# Patient Record
Sex: FEMALE | Race: WHITE | NOT HISPANIC OR LATINO | URBAN - METROPOLITAN AREA
[De-identification: names, ages, dates, MRNs, and addresses within clinical notes are randomized per-mention and may not be internally consistent; named-entity substitution may affect disease eponyms.]

---

## 2020-02-20 ENCOUNTER — EMERGENCY (EMERGENCY)
Facility: HOSPITAL | Age: 65
LOS: 1 days | Discharge: HOME | End: 2020-02-22
Attending: EMERGENCY MEDICINE
Payer: MEDICARE

## 2020-02-20 VITALS — WEIGHT: 139.99 LBS | HEIGHT: 60 IN

## 2020-02-20 DIAGNOSIS — I16.0 HYPERTENSIVE URGENCY: ICD-10-CM

## 2020-02-20 DIAGNOSIS — F32.9 MAJOR DEPRESSIVE DISORDER, SINGLE EPISODE, UNSPECIFIED: ICD-10-CM

## 2020-02-20 DIAGNOSIS — C79.51 SECONDARY MALIGNANT NEOPLASM OF BONE: ICD-10-CM

## 2020-02-20 DIAGNOSIS — R51 HEADACHE: ICD-10-CM

## 2020-02-20 DIAGNOSIS — R03.0 ELEVATED BLOOD-PRESSURE READING, WITHOUT DIAGNOSIS OF HYPERTENSION: ICD-10-CM

## 2020-02-20 DIAGNOSIS — B96.89 OTHER SPECIFIED BACTERIAL AGENTS AS THE CAUSE OF DISEASES CLASSIFIED ELSEWHERE: ICD-10-CM

## 2020-02-20 DIAGNOSIS — F41.9 ANXIETY DISORDER, UNSPECIFIED: ICD-10-CM

## 2020-02-20 DIAGNOSIS — C50.919 MALIGNANT NEOPLASM OF UNSPECIFIED SITE OF UNSPECIFIED FEMALE BREAST: ICD-10-CM

## 2020-02-20 DIAGNOSIS — R79.89 OTHER SPECIFIED ABNORMAL FINDINGS OF BLOOD CHEMISTRY: ICD-10-CM

## 2020-02-20 DIAGNOSIS — Z88.1 ALLERGY STATUS TO OTHER ANTIBIOTIC AGENTS STATUS: ICD-10-CM

## 2020-02-20 DIAGNOSIS — Z92.21 PERSONAL HISTORY OF ANTINEOPLASTIC CHEMOTHERAPY: ICD-10-CM

## 2020-02-20 DIAGNOSIS — N39.0 URINARY TRACT INFECTION, SITE NOT SPECIFIED: ICD-10-CM

## 2020-02-20 LAB
ALBUMIN SERPL ELPH-MCNC: 3.6 G/DL — SIGNIFICANT CHANGE UP (ref 3.3–5)
ALP SERPL-CCNC: 70 U/L — SIGNIFICANT CHANGE UP (ref 40–120)
ALT FLD-CCNC: 22 U/L — SIGNIFICANT CHANGE UP (ref 12–78)
ANION GAP SERPL CALC-SCNC: 6 MMOL/L — SIGNIFICANT CHANGE UP (ref 5–17)
APPEARANCE UR: CLEAR — SIGNIFICANT CHANGE UP
AST SERPL-CCNC: 28 U/L — SIGNIFICANT CHANGE UP (ref 15–37)
BASOPHILS # BLD AUTO: 0.04 K/UL — SIGNIFICANT CHANGE UP (ref 0–0.2)
BASOPHILS NFR BLD AUTO: 1.6 % — SIGNIFICANT CHANGE UP (ref 0–2)
BILIRUB SERPL-MCNC: 0.2 MG/DL — SIGNIFICANT CHANGE UP (ref 0.2–1.2)
BILIRUB UR-MCNC: NEGATIVE — SIGNIFICANT CHANGE UP
BUN SERPL-MCNC: 21 MG/DL — SIGNIFICANT CHANGE UP (ref 7–23)
CALCIUM SERPL-MCNC: 9.2 MG/DL — SIGNIFICANT CHANGE UP (ref 8.5–10.1)
CHLORIDE SERPL-SCNC: 107 MMOL/L — SIGNIFICANT CHANGE UP (ref 96–108)
CK SERPL-CCNC: 87 U/L — SIGNIFICANT CHANGE UP (ref 26–192)
CO2 SERPL-SCNC: 28 MMOL/L — SIGNIFICANT CHANGE UP (ref 22–31)
COLOR SPEC: YELLOW — SIGNIFICANT CHANGE UP
CREAT SERPL-MCNC: 1.21 MG/DL — SIGNIFICANT CHANGE UP (ref 0.5–1.3)
DIFF PNL FLD: ABNORMAL
EOSINOPHIL # BLD AUTO: 0.03 K/UL — SIGNIFICANT CHANGE UP (ref 0–0.5)
EOSINOPHIL NFR BLD AUTO: 1.2 % — SIGNIFICANT CHANGE UP (ref 0–6)
GLUCOSE SERPL-MCNC: 100 MG/DL — HIGH (ref 70–99)
GLUCOSE UR QL: NEGATIVE MG/DL — SIGNIFICANT CHANGE UP
HCT VFR BLD CALC: 36.1 % — SIGNIFICANT CHANGE UP (ref 34.5–45)
HGB BLD-MCNC: 11.5 G/DL — SIGNIFICANT CHANGE UP (ref 11.5–15.5)
IMM GRANULOCYTES NFR BLD AUTO: 0.4 % — SIGNIFICANT CHANGE UP (ref 0–1.5)
KETONES UR-MCNC: NEGATIVE — SIGNIFICANT CHANGE UP
LACTATE SERPL-SCNC: 1.1 MMOL/L — SIGNIFICANT CHANGE UP (ref 0.7–2)
LEUKOCYTE ESTERASE UR-ACNC: ABNORMAL
LYMPHOCYTES # BLD AUTO: 0.73 K/UL — LOW (ref 1–3.3)
LYMPHOCYTES # BLD AUTO: 28.9 % — SIGNIFICANT CHANGE UP (ref 13–44)
MCHC RBC-ENTMCNC: 31.6 PG — SIGNIFICANT CHANGE UP (ref 27–34)
MCHC RBC-ENTMCNC: 31.9 GM/DL — LOW (ref 32–36)
MCV RBC AUTO: 99.2 FL — SIGNIFICANT CHANGE UP (ref 80–100)
MONOCYTES # BLD AUTO: 0.41 K/UL — SIGNIFICANT CHANGE UP (ref 0–0.9)
MONOCYTES NFR BLD AUTO: 16.2 % — HIGH (ref 2–14)
NEUTROPHILS # BLD AUTO: 1.31 K/UL — LOW (ref 1.8–7.4)
NEUTROPHILS NFR BLD AUTO: 51.7 % — SIGNIFICANT CHANGE UP (ref 43–77)
NITRITE UR-MCNC: NEGATIVE — SIGNIFICANT CHANGE UP
PH UR: 6 — SIGNIFICANT CHANGE UP (ref 5–8)
PLATELET # BLD AUTO: 234 K/UL — SIGNIFICANT CHANGE UP (ref 150–400)
POTASSIUM SERPL-MCNC: 4.3 MMOL/L — SIGNIFICANT CHANGE UP (ref 3.5–5.3)
POTASSIUM SERPL-SCNC: 4.3 MMOL/L — SIGNIFICANT CHANGE UP (ref 3.5–5.3)
PROT SERPL-MCNC: 8 GM/DL — SIGNIFICANT CHANGE UP (ref 6–8.3)
PROT UR-MCNC: 15 MG/DL
RBC # BLD: 3.64 M/UL — LOW (ref 3.8–5.2)
RBC # FLD: 13.9 % — SIGNIFICANT CHANGE UP (ref 10.3–14.5)
SODIUM SERPL-SCNC: 141 MMOL/L — SIGNIFICANT CHANGE UP (ref 135–145)
SP GR SPEC: 1.01 — SIGNIFICANT CHANGE UP (ref 1.01–1.02)
TROPONIN I SERPL-MCNC: 0.08 NG/ML — HIGH (ref 0.01–0.04)
UROBILINOGEN FLD QL: NEGATIVE MG/DL — SIGNIFICANT CHANGE UP
WBC # BLD: 2.53 K/UL — LOW (ref 3.8–10.5)
WBC # FLD AUTO: 2.53 K/UL — LOW (ref 3.8–10.5)

## 2020-02-20 PROCEDURE — 87040 BLOOD CULTURE FOR BACTERIA: CPT

## 2020-02-20 PROCEDURE — 85025 COMPLETE CBC W/AUTO DIFF WBC: CPT

## 2020-02-20 PROCEDURE — 93005 ELECTROCARDIOGRAM TRACING: CPT

## 2020-02-20 PROCEDURE — 80053 COMPREHEN METABOLIC PANEL: CPT

## 2020-02-20 PROCEDURE — 87086 URINE CULTURE/COLONY COUNT: CPT

## 2020-02-20 PROCEDURE — 86803 HEPATITIS C AB TEST: CPT

## 2020-02-20 PROCEDURE — 70450 CT HEAD/BRAIN W/O DYE: CPT

## 2020-02-20 PROCEDURE — 99285 EMERGENCY DEPT VISIT HI MDM: CPT | Mod: 25

## 2020-02-20 PROCEDURE — 99284 EMERGENCY DEPT VISIT MOD MDM: CPT

## 2020-02-20 PROCEDURE — 71045 X-RAY EXAM CHEST 1 VIEW: CPT | Mod: 26

## 2020-02-20 PROCEDURE — 70450 CT HEAD/BRAIN W/O DYE: CPT | Mod: 26

## 2020-02-20 PROCEDURE — 85027 COMPLETE CBC AUTOMATED: CPT

## 2020-02-20 PROCEDURE — 93010 ELECTROCARDIOGRAM REPORT: CPT

## 2020-02-20 PROCEDURE — 83605 ASSAY OF LACTIC ACID: CPT

## 2020-02-20 PROCEDURE — 81001 URINALYSIS AUTO W/SCOPE: CPT

## 2020-02-20 PROCEDURE — 71045 X-RAY EXAM CHEST 1 VIEW: CPT

## 2020-02-20 PROCEDURE — G0378: CPT

## 2020-02-20 PROCEDURE — 82550 ASSAY OF CK (CPK): CPT

## 2020-02-20 PROCEDURE — 96374 THER/PROPH/DIAG INJ IV PUSH: CPT

## 2020-02-20 PROCEDURE — 36415 COLL VENOUS BLD VENIPUNCTURE: CPT

## 2020-02-20 PROCEDURE — 80061 LIPID PANEL: CPT

## 2020-02-20 PROCEDURE — 84484 ASSAY OF TROPONIN QUANT: CPT

## 2020-02-20 RX ORDER — CEFEPIME 1 G/1
1000 INJECTION, POWDER, FOR SOLUTION INTRAMUSCULAR; INTRAVENOUS ONCE
Refills: 0 | Status: COMPLETED | OUTPATIENT
Start: 2020-02-20 | End: 2020-02-20

## 2020-02-20 RX ADMIN — CEFEPIME 1000 MILLIGRAM(S): 1 INJECTION, POWDER, FOR SOLUTION INTRAMUSCULAR; INTRAVENOUS at 23:38

## 2020-02-20 NOTE — ED ADULT TRIAGE NOTE - CHIEF COMPLAINT QUOTE
pt c/o headache, dizziness, reports BP at home 211/75 30 min ago.  Took 10 mg of nifedipine PTA.  - vision changes, hx of stage 4 CA.  LAMS score 0 in triage

## 2020-02-20 NOTE — ED STATDOCS - PROGRESS NOTE DETAILS
Esthela FAJARDO for ED attending, Dr. Marcelino: 65 y/o F with PMHx of stage 4 breast CA presenting to the ED c/o high blood pressure. Patient reports she started to get dizziness, and HA and she took her BP and found it to be 211/ 75.  Patient took 10 mg Nifedipine PTA and the dizziness and HA subsided on ED arrival. No past hx of having this HA or dizziness. Notes that she had recent lab results x3 days ago and found to have low blood cell counts.  Denies any CP, dysuria, cough, nasal congestion, blurry vision, double vision, fever or chills. Patient will be sent to [facility] for further evaluation.

## 2020-02-20 NOTE — ED PROVIDER NOTE - NS_ ATTENDINGSCRIBEDETAILS _ED_A_ED_FT
I, Richard Galicia MD,  performed the initial face to face bedside interview with this patient regarding history of present illness, review of symptoms and relevant past medical, social and family history.  I completed an independent physical examination.    The history, relevant review of systems, past medical and surgical history, medical decision making, and physical examination was documented by the scribe in my presence and I attest to the accuracy of the documentation.

## 2020-02-20 NOTE — ED ADULT NURSE NOTE - OBJECTIVE STATEMENT
Pt presented to ED c/o subjective hypertension at home tonight. Pt states she took her pressure at home and it was over 200 systolic. Took Nifedipine PO "because you're only supposed to take it if your pressure is high" PTA. Denies headache, dizziness, nausea, blurred vision. Ambulatory from triage. Multiple abrasions and skin tears due to oral chemo agents. No active bleeding. Neutropenic precautions. Hx of breast cancer, on oral chemo. Safety/fall precautions.

## 2020-02-20 NOTE — ED ADULT NURSE NOTE - NSIMPLEMENTINTERV_GEN_ALL_ED
Implemented All Fall Risk Interventions:  [place] to call system. Call bell, personal items and telephone within reach. Instruct patient to call for assistance. Room bathroom lighting operational. Non-slip footwear when patient is off stretcher. Physically safe environment: no spills, clutter or unnecessary equipment. Stretcher in lowest position, wheels locked, appropriate side rails in place. Provide visual cue, wrist band, yellow gown, etc. Monitor gait and stability. Monitor for mental status changes and reorient to person, place, and time. Review medications for side effects contributing to fall risk. Reinforce activity limits and safety measures with patient and family.

## 2020-02-20 NOTE — ED PROVIDER NOTE - OBJECTIVE STATEMENT
65 y/o female with PMHx of stage 4 breast CA presents to the ED c/o sudden onset of HA tonight. Took BP found it to be 211/78. Took 10 mg Nifedipine PTA and HA has since resolved. Reports recent outpatient blood work showed low WBC 2/2 chemo. No fever. Allergic to erythromycin. PCP: Dr. Joel Goldberg.

## 2020-02-21 DIAGNOSIS — R82.90 UNSPECIFIED ABNORMAL FINDINGS IN URINE: ICD-10-CM

## 2020-02-21 DIAGNOSIS — Z29.9 ENCOUNTER FOR PROPHYLACTIC MEASURES, UNSPECIFIED: ICD-10-CM

## 2020-02-21 DIAGNOSIS — R79.89 OTHER SPECIFIED ABNORMAL FINDINGS OF BLOOD CHEMISTRY: ICD-10-CM

## 2020-02-21 DIAGNOSIS — I16.0 HYPERTENSIVE URGENCY: ICD-10-CM

## 2020-02-21 DIAGNOSIS — C50.919 MALIGNANT NEOPLASM OF UNSPECIFIED SITE OF UNSPECIFIED FEMALE BREAST: ICD-10-CM

## 2020-02-21 LAB
TROPONIN I SERPL-MCNC: 0.52 NG/ML — HIGH (ref 0.01–0.04)
TROPONIN I SERPL-MCNC: 0.87 NG/ML — HIGH (ref 0.01–0.04)

## 2020-02-21 PROCEDURE — 99220: CPT

## 2020-02-21 PROCEDURE — 93010 ELECTROCARDIOGRAM REPORT: CPT

## 2020-02-21 RX ORDER — TRANYLCYPROMINE SULFATE 10 MG/1
30 TABLET, FILM COATED ORAL
Refills: 0 | Status: DISCONTINUED | OUTPATIENT
Start: 2020-02-21 | End: 2020-02-22

## 2020-02-21 RX ORDER — CEPHALEXIN 500 MG
500 CAPSULE ORAL EVERY 12 HOURS
Refills: 0 | Status: DISCONTINUED | OUTPATIENT
Start: 2020-02-21 | End: 2020-02-22

## 2020-02-21 RX ORDER — CALCIUM CARBONATE 500(1250)
2 TABLET ORAL DAILY
Refills: 0 | Status: DISCONTINUED | OUTPATIENT
Start: 2020-02-21 | End: 2020-02-22

## 2020-02-21 RX ORDER — ATORVASTATIN CALCIUM 80 MG/1
10 TABLET, FILM COATED ORAL AT BEDTIME
Refills: 0 | Status: DISCONTINUED | OUTPATIENT
Start: 2020-02-21 | End: 2020-02-22

## 2020-02-21 RX ORDER — ONDANSETRON 8 MG/1
4 TABLET, FILM COATED ORAL EVERY 6 HOURS
Refills: 0 | Status: DISCONTINUED | OUTPATIENT
Start: 2020-02-21 | End: 2020-02-22

## 2020-02-21 RX ORDER — ENOXAPARIN SODIUM 100 MG/ML
40 INJECTION SUBCUTANEOUS DAILY
Refills: 0 | Status: DISCONTINUED | OUTPATIENT
Start: 2020-02-21 | End: 2020-02-22

## 2020-02-21 RX ORDER — ACETAMINOPHEN 500 MG
650 TABLET ORAL EVERY 6 HOURS
Refills: 0 | Status: DISCONTINUED | OUTPATIENT
Start: 2020-02-21 | End: 2020-02-22

## 2020-02-21 RX ORDER — HYDRALAZINE HCL 50 MG
10 TABLET ORAL EVERY 6 HOURS
Refills: 0 | Status: DISCONTINUED | OUTPATIENT
Start: 2020-02-21 | End: 2020-02-22

## 2020-02-21 RX ORDER — LETROZOLE 2.5 MG/1
2.5 TABLET, FILM COATED ORAL DAILY
Refills: 0 | Status: DISCONTINUED | OUTPATIENT
Start: 2020-02-21 | End: 2020-02-22

## 2020-02-21 RX ORDER — CHOLECALCIFEROL (VITAMIN D3) 125 MCG
1000 CAPSULE ORAL DAILY
Refills: 0 | Status: DISCONTINUED | OUTPATIENT
Start: 2020-02-21 | End: 2020-02-22

## 2020-02-21 RX ORDER — ASPIRIN/CALCIUM CARB/MAGNESIUM 324 MG
81 TABLET ORAL DAILY
Refills: 0 | Status: DISCONTINUED | OUTPATIENT
Start: 2020-02-21 | End: 2020-02-22

## 2020-02-21 RX ADMIN — ENOXAPARIN SODIUM 40 MILLIGRAM(S): 100 INJECTION SUBCUTANEOUS at 11:24

## 2020-02-21 RX ADMIN — Medication 2 TABLET(S): at 13:42

## 2020-02-21 RX ADMIN — LETROZOLE 2.5 MILLIGRAM(S): 2.5 TABLET, FILM COATED ORAL at 13:42

## 2020-02-21 RX ADMIN — Medication 1000 UNIT(S): at 11:23

## 2020-02-21 RX ADMIN — Medication 81 MILLIGRAM(S): at 11:23

## 2020-02-21 RX ADMIN — Medication 500 MILLIGRAM(S): at 17:32

## 2020-02-21 NOTE — H&P ADULT - PROBLEM SELECTOR PLAN 2
indeterminate  denies any cp, sob  likely demand ischemia 2/2 stress, anxiety   monitor on tele  check serial trops  cardio cs- dw Dr Thomas   check TTE, lipid profile  start asa 81mg

## 2020-02-21 NOTE — H&P ADULT - PROBLEM SELECTOR PLAN 1
bp improved now  start prn hydralazine  monitor bp and if pt consistently requires PRN, consider d/c on OTC  Headache improved w/ bp improvement

## 2020-02-21 NOTE — CONSULT NOTE ADULT - SUBJECTIVE AND OBJECTIVE BOX
Patient is a 64y old  Female who presents with a chief complaint of elevated bp, HA.       HPI:  HPI:  64F w/PMH Breast CA st4 w/ mets to spine, depression, anxiety, presents c/o elevated bp.  Pt started having global HA, 10/10, squeezing in quality and decided to check her BP and found it to be  211/78.  She had her  repeat it several times w/ similar outcome.  She decided to take a nortriptyline that she had at home, after which her bp and HA improved.  She came to ED as she's never experienced this before and she does not have a h/o htn.  She denies cp, sob, blurry vision, n/v/d, abd pain, dysuria, f/c.  Pt endorses anxiety and increased stress recently.      In ED, WBC 2.5, trop 0.08-->0.8, cxr neg, CT head neg, EKG NSR, UA +LE/bact- given IV cefepime.      Pt denies any CP or SOB. Pt states that she did not eat any thing out of ordinary.    PAST MEDICAL & SURGICAL HISTORY:  Breast CA - stage 4 w/ mets to spine   depression  anxiety  s/p spinal surgery      Allergies  erythromycin (Stomach Upset; Vomiting)      Social History:   lives w/   ind ADLs  denies smoking/etoh/drugs    FAMILY HISTORY:  unable to determine at this time d/t pt's anxiety     Home Medications:  Allegra: 90 microgram(s) orally once a day, As Needed (2020 09:59)  calcium carbonate 1000 mg oral tablet, chewable: orally once a day (2020 09:59)  flunisolide 25 mcg/inh nasal spray: 2 puff(s) nasal 2 times a day (:59)  Ibrance 75 mg oral capsule: 1 cap(s) orally once a day (2020 09:59)  letrozole 2.5 mg oral tablet: 1 tab(s) orally once a day (:59)  Livalo 2 mg oral tablet: 1 tab(s) orally once a day (at bedtime) (2020 09:59)  Parnate: 40 milligram(s) orally once a day (2020 09:59)  Vitamin D3 1000 intl units (25 mcg) oral tablet: 1 tab(s) orally once a day (2020 09:59)      MEDICATIONS  (STANDING):  atorvastatin 10 milliGRAM(s) Oral at bedtime  calcium carbonate    500 mG (Tums) Chewable 2 Tablet(s) Chew daily  cephalexin 500 milliGRAM(s) Oral every 12 hours  cholecalciferol 1000 Unit(s) Oral daily  enoxaparin Injectable 40 milliGRAM(s) SubCutaneous daily  letrozole 2.5 milliGRAM(s) Oral daily    MEDICATIONS  (PRN):  acetaminophen   Tablet .. 650 milliGRAM(s) Oral every 6 hours PRN Temp greater or equal to 38C (100.4F), Mild Pain (1 - 3)  hydrALAZINE Injectable 10 milliGRAM(s) IV Push every 6 hours PRN sbp>150  ondansetron Injectable 4 milliGRAM(s) IV Push every 6 hours PRN Nausea        PHYSICAL EXAM:  Vital Signs Last 24 Hrs  T(C): 36.6 (2020 10:14), Max: 36.8 (2020 23:39)  T(F): 97.9 (2020 10:14), Max: 98.2 (2020 23:39)  HR: 80 (2020 10:14) (68 - 96)  BP: 99/54 (2020 10:14) (99/54 - 147/66)  BP(mean): 77 (2020 01:04) (77 - 90)  RR: 18 (2020 10:14) (16 - 18)  SpO2: 95% (2020 10:14) (95% - 100%)          PAST MEDICAL & SURGICAL HISTORY:  Breast CA  No significant past surgical history      MEDICATIONS  (STANDING):  aspirin  chewable 81 milliGRAM(s) Oral daily  atorvastatin 10 milliGRAM(s) Oral at bedtime  calcium carbonate    500 mG (Tums) Chewable 2 Tablet(s) Chew daily  cephalexin 500 milliGRAM(s) Oral every 12 hours  cholecalciferol 1000 Unit(s) Oral daily  enoxaparin Injectable 40 milliGRAM(s) SubCutaneous daily  letrozole 2.5 milliGRAM(s) Oral daily  tranylcypromine 30 milliGRAM(s) Oral two times a day    MEDICATIONS  (PRN):  acetaminophen   Tablet .. 650 milliGRAM(s) Oral every 6 hours PRN Temp greater or equal to 38C (100.4F), Mild Pain (1 - 3)  hydrALAZINE Injectable 10 milliGRAM(s) IV Push every 6 hours PRN sbp>150  ondansetron Injectable 4 milliGRAM(s) IV Push every 6 hours PRN Nausea          REVIEW OF SYSTEMS:  CONSTITUTIONAL:    No fatigue, malaise, lethargy.  No fever or chills.  RESPIRATORY:  No cough.  No wheeze.  No hemoptysis.  No shortness of breath.  CARDIOVASCULAR:  No chest pains.  No palpitations. No shortness of breath, No orthopnea or PND.  GASTROINTESTINAL:  No abdominal pain.  No nausea or vomiting.    GENITOURINARY:    No hematuria.    MUSCULOSKELETAL:  No musculoskeletal pain.  No joint swelling.  No arthritis.  NEUROLOGICAL:  No tingling or numbness or weakness.  PSYCHIATRIC:  No confusion  SKIN:  No rashes.             Vital Signs Last 24 Hrs  T(C): 36.6 (2020 10:14), Max: 36.8 (2020 23:39)  T(F): 97.9 (2020 10:14), Max: 98.2 (2020 23:39)  HR: 80 (2020 10:14) (68 - 96)  BP: 99/54 (2020 10:14) (99/54 - 147/66)  BP(mean): 77 (2020 01:04) (77 - 90)  RR: 18 (2020 10:14) (16 - 18)  SpO2: 95% (2020 10:14) (95% - 100%)    PHYSICAL EXAM-    Constitutional: The patient appears to be normal, well developed, well nourished and alert and oriented to time, place and person. The patient does not appear acutely ill.     Head: Head is normocephalic and atraumatic.      Neck: No jugular venous distention. No audible carotid bruits. There are strong carotid pulses bilaterally. No JVD.     Cardiovascular: Regular rate and rhythm without S3, S4. No murmurs or rubs are appreciated.      Respiratory: Breathsounds are normal. No rales. No wheezing.    Abdomen: Soft, nontender, nondistended with positive bowel sounds.      Extremity: No tenderness. No  pitting edema     Neurologic: The patient is alert and oriented.      Skin: No rash, no obvious lesions noted.      Psychiatric: The patient appears to be emotionally stable.      INTERPRETATION OF TELEMETRY: SR     ECG: Sinus rythm , normal axis, no ST T changes.     I&O's Detail      LABS:                        11.5   2.53  )-----------( 234      ( 2020 21:10 )             36.1         141  |  107  |  21  ----------------------------<  100<H>  4.3   |  28  |  1.21    Ca    9.2      2020 21:10    TPro  8.0  /  Alb  3.6  /  TBili  0.2  /  DBili  x   /  AST  28  /  ALT  22  /  AlkPhos  70      CARDIAC MARKERS ( 2020 08:04 )  0.521 ng/mL / x     / x     / x     / x      CARDIAC MARKERS ( 2020 00:34 )  0.874 ng/mL / x     / x     / x     / x      CARDIAC MARKERS ( 2020 21:10 )  0.084 ng/mL / x     / 87 U/L / x     / x            Urinalysis Basic - ( 2020 21:10 )    Color: Yellow / Appearance: Clear / S.015 / pH: x  Gluc: x / Ketone: Negative  / Bili: Negative / Urobili: Negative mg/dL   Blood: x / Protein: 15 mg/dL / Nitrite: Negative   Leuk Esterase: Moderate / RBC: 0-2 /HPF / WBC 3-5   Sq Epi: x / Non Sq Epi: Occasional / Bacteria: Occasional      I&O's Summary    BNP  RADIOLOGY & ADDITIONAL STUDIES:  < from: Xray Chest 1 View-PORTABLE IMMEDIATE (20 @ 21:33) >    EXAM:  XR CHEST PORTABLE IMMED 1V                            PROCEDURE DATE:  2020          INTERPRETATION:  CHEST AP PORTABLE:    History: Breast cancer. Dizziness..    Date and time of exam: 2020 9:25 PM.    Comparison: No prior exam.    Technique: A single AP view of the chest was obtained.    Findings:  The lungs are clear. The heart and mediastinum are unremarkable.  No hilar abnormality is seen.  There is no evidence of pleural effusion. The visualized osseous structures demonstrate degenerative changes in the spine. Postoperative changes involving the lumbar spine. Hiatus hernia.    Impression:    No evidence of acute cardiopulmonary disease.      SHRAVAN CONWAY   This document has been electronically signed. 2020 10:08AM    < end of copied text >

## 2020-02-21 NOTE — CONSULT NOTE ADULT - ASSESSMENT
Headache, elevated BP, HYpertensive urgency- Her BP is normal now.  SHe took nifedipine at home.  I called her cardiologist Dr Joel Goldberg.  pt stress test an yr ago was normal reportedly.  Advise monitoring off meds for now.  She had been taking Parnate which can interract with multiple meds and foods.  Pt well aware of this.  She can have labetalol 100mg to carry with her in avi e this arises again.  She is going to see her cardiologist next week.       Other medical issues- Management per primary team.    Thank you for allowing me to participate in the care of this patient. Please feel free to contact me with any questions.

## 2020-02-21 NOTE — H&P ADULT - HISTORY OF PRESENT ILLNESS
HPI:        cxr neg  CT head neg     EKG Personally Reviewed:  NSR  PAST MEDICAL & SURGICAL HISTORY:  Breast CA  No significant past surgical history      Review of Systems:   CONSTITUTIONAL: No fever.  EYES: No eye pain or discharge.  ENMT:  No sinus or throat pain  NECK: No pain or stiffness  RESPIRATORY: No cough, wheezing, chills or hemoptysis; No shortness of breath  CARDIOVASCULAR: No chest pain, palpitations, dizziness, or leg swelling  GASTROINTESTINAL: No abdominal or epigastric pain. No nausea, vomiting, or hematemesis; No diarrhea or constipation. No melena or hematochezia.  GENITOURINARY: No dysuria or incontinence  NEUROLOGICAL: No headaches, memory loss, loss of strength, numbness, or tremors  SKIN: No rashes.  LYMPH NODES: No enlarged glands  ENDOCRINE: No heat or cold intolerance; No hair loss  MUSCULOSKELETAL: No joint pain or swelling; No muscle, back, or extremity pain  PSYCHIATRIC: No depression, anxiety, mood swings, or difficulty sleeping  HEME/LYMPH: No easy bruising, or bleeding gums  ALLERY AND IMMUNOLOGIC: No hives or eczema    Allergies    erythromycin (Stomach Upset; Vomiting)    Intolerances        Social History:     FAMILY HISTORY:      Home Medications:  Allegra: 90 microgram(s) orally once a day, As Needed (:59)  calcium carbonate 1000 mg oral tablet, chewable: orally once a day (:59)  flunisolide 25 mcg/inh nasal spray: 2 puff(s) nasal 2 times a day (:59)  Ibrance 75 mg oral capsule: 1 cap(s) orally once a day (:59)  letrozole 2.5 mg oral tablet: 1 tab(s) orally once a day (:59)  Livalo 2 mg oral tablet: 1 tab(s) orally once a day (at bedtime) (:59)  Parnate: 40 milligram(s) orally once a day (:59)  Vitamin D3 1000 intl units (25 mcg) oral tablet: 1 tab(s) orally once a day (:59)      MEDICATIONS  (STANDING):  atorvastatin 10 milliGRAM(s) Oral at bedtime  calcium carbonate    500 mG (Tums) Chewable 2 Tablet(s) Chew daily  cephalexin 500 milliGRAM(s) Oral every 12 hours  cholecalciferol 1000 Unit(s) Oral daily  enoxaparin Injectable 40 milliGRAM(s) SubCutaneous daily  letrozole 2.5 milliGRAM(s) Oral daily    MEDICATIONS  (PRN):  acetaminophen   Tablet .. 650 milliGRAM(s) Oral every 6 hours PRN Temp greater or equal to 38C (100.4F), Mild Pain (1 - 3)  hydrALAZINE Injectable 10 milliGRAM(s) IV Push every 6 hours PRN sbp>150  ondansetron Injectable 4 milliGRAM(s) IV Push every 6 hours PRN Nausea      CAPILLARY BLOOD GLUCOSE        I&O's Summary      PHYSICAL EXAM:  Vital Signs Last 24 Hrs  T(C): 36.6 (2020 10:14), Max: 36.8 (2020 23:39)  T(F): 97.9 (2020 10:14), Max: 98.2 (2020 23:39)  HR: 80 (2020 10:14) (68 - 96)  BP: 99/54 (2020 10:14) (99/54 - 147/66)  BP(mean): 77 (2020 01:04) (77 - 90)  RR: 18 (2020 10:14) (16 - 18)  SpO2: 95% (2020 10:14) (95% - 100%)  GENERAL: NAD, well-developed  HEAD:  Atraumatic, Normocephalic  EYES: EOMI, PERRLA, conjunctiva and sclera clear  ENT: normal hearing, no nasal discharge, throat clear, dentition normal  NECK: Supple, No JVD, no LAD, no thyromegaly   CHEST/LUNG: Clear to auscultation bilaterally; No wheeze, respirations unlabored  HEART: Regular rate and rhythm; No murmurs, rubs, or gallops  ABDOMEN: Soft, Nontender, Nondistended; Bowel sounds present, no HSM  EXTREMITIES:  2+ Peripheral Pulses, No clubbing, cyanosis, or edema  PSYCH: AAOx3, normal behavior  NEUROLOGY: non-focal, sensory and cn 2-12 intact, speech/language intact  SKIN: No visible rashes or lesions    LABS:                        11.5   2.53  )-----------( 234      ( 2020 21:10 )             36.1     02-20    141  |  107  |  21  ----------------------------<  100<H>  4.3   |  28  |  1.21    Ca    9.2      2020 21:10    TPro  8.0  /  Alb  3.6  /  TBili  0.2  /  DBili  x   /  AST  28  /  ALT  22  /  AlkPhos  70  02-20      CARDIAC MARKERS ( 2020 08:04 )  0.521 ng/mL / x     / x     / x     / x      CARDIAC MARKERS ( 2020 00:34 )  0.874 ng/mL / x     / x     / x     / x      CARDIAC MARKERS ( 2020 21:10 )  0.084 ng/mL / x     / 87 U/L / x     / x          Urinalysis Basic - ( 2020 21:10 )    Color: Yellow / Appearance: Clear / S.015 / pH: x  Gluc: x / Ketone: Negative  / Bili: Negative / Urobili: Negative mg/dL   Blood: x / Protein: 15 mg/dL / Nitrite: Negative   Leuk Esterase: Moderate / RBC: 0-2 /HPF / WBC 3-5   Sq Epi: x / Non Sq Epi: Occasional / Bacteria: Occasional        RADIOLOGY & ADDITIONAL TESTS:    Imaging Personally Reviewed:  cxr neg  CT head neg     EKG Personally Reviewed:  NSR HPI:  64F w/PMH Breast CA st4 w/ mets to spine, depression, anxiety, presents c/o elevated bp.  Pt started having global HA, 10/10, squeezing in quality and decided to check her BP and found it to be  211/78.  She had her  repeat it several times w/ similar outcome.  She decided to take a nortriptyline that she had at home, after which her bp and HA improved.  She came to ED as she's never experienced this before and she does not have a h/o htn.  She denies cp, sob, blurry vision, n/v/d, abd pain, dysuria, f/c.  Pt endorses anxiety and increased stress recently.      In ED, WBC 2.5, trop 0.08-->0.8, cxr neg, CT head neg, EKG NSR, UA +LE/bact- given IV cefepime.      PAST MEDICAL & SURGICAL HISTORY:  Breast CA - stage 4 w/ mets to spine   depression  anxiety  s/p spinal surgery      Review of Systems:   CONSTITUTIONAL: No fever. ++Headache   EYES: No eye pain or discharge.  ENMT:  No sinus or throat pain  NECK: No pain or stiffness  RESPIRATORY: No cough, wheezing, chills or hemoptysis; No shortness of breath  CARDIOVASCULAR: No chest pain, palpitations, dizziness, or leg swelling  GASTROINTESTINAL: No abdominal or epigastric pain. No nausea, vomiting, or hematemesis; No diarrhea or constipation. No melena or hematochezia.  GENITOURINARY: No dysuria or incontinence  NEUROLOGICAL: No  memory loss, loss of strength, numbness, or tremors  SKIN: No rashes.  MUSCULOSKELETAL: No joint pain or swelling; No muscle, back, or extremity pain  PSYCHIATRIC: ++depression, anxiety     Allergies  erythromycin (Stomach Upset; Vomiting)      Social History:   lives w/   ind ADLs  denies smoking/etoh/drugs    FAMILY HISTORY:  unable to determine at this time d/t pt's anxiety     Home Medications:  Allegra: 90 microgram(s) orally once a day, As Needed (2020 09:59)  calcium carbonate 1000 mg oral tablet, chewable: orally once a day (2020 09:59)  flunisolide 25 mcg/inh nasal spray: 2 puff(s) nasal 2 times a day (2020 09:59)  Ibrance 75 mg oral capsule: 1 cap(s) orally once a day (2020 09:59)  letrozole 2.5 mg oral tablet: 1 tab(s) orally once a day (2020 09:59)  Livalo 2 mg oral tablet: 1 tab(s) orally once a day (at bedtime) (2020 09:59)  Parnate: 40 milligram(s) orally once a day (2020 09:59)  Vitamin D3 1000 intl units (25 mcg) oral tablet: 1 tab(s) orally once a day (2020 09:59)      MEDICATIONS  (STANDING):  atorvastatin 10 milliGRAM(s) Oral at bedtime  calcium carbonate    500 mG (Tums) Chewable 2 Tablet(s) Chew daily  cephalexin 500 milliGRAM(s) Oral every 12 hours  cholecalciferol 1000 Unit(s) Oral daily  enoxaparin Injectable 40 milliGRAM(s) SubCutaneous daily  letrozole 2.5 milliGRAM(s) Oral daily    MEDICATIONS  (PRN):  acetaminophen   Tablet .. 650 milliGRAM(s) Oral every 6 hours PRN Temp greater or equal to 38C (100.4F), Mild Pain (1 - 3)  hydrALAZINE Injectable 10 milliGRAM(s) IV Push every 6 hours PRN sbp>150  ondansetron Injectable 4 milliGRAM(s) IV Push every 6 hours PRN Nausea        PHYSICAL EXAM:  Vital Signs Last 24 Hrs  T(C): 36.6 (2020 10:14), Max: 36.8 (2020 23:39)  T(F): 97.9 (2020 10:14), Max: 98.2 (2020 23:39)  HR: 80 (2020 10:14) (68 - 96)  BP: 99/54 (2020 10:14) (99/54 - 147/66)  BP(mean): 77 (2020 01:04) (77 - 90)  RR: 18 (2020 10:14) (16 - 18)  SpO2: 95% (2020 10:14) (95% - 100%)  GENERAL: NAD, well-developed, very anxious, tearful   HEAD:  Atraumatic, Normocephalic  EYES: EOMI, PERRLA, conjunctiva and sclera clear  ENT: normal hearing, no nasal discharge, throat clear, dentition normal  NECK: Supple, No JVD, no LAD, no thyromegaly   CHEST/LUNG: Clear to auscultation bilaterally; No wheeze, respirations unlabored  HEART: Regular rate and rhythm; No murmurs, rubs, or gallops  ABDOMEN: Soft, Nontender, Nondistended; Bowel sounds present, no HSM  EXTREMITIES:  2+ Peripheral Pulses, No clubbing, cyanosis, or edema  PSYCH: AAOx3, anxious  NEUROLOGY: non-focal, sensory and cn 2-12 intact, speech/language intact  SKIN: No visible rashes or lesions    LABS:                        11.5   2.53  )-----------( 234      ( 2020 21:10 )             36.1     02-20    141  |  107  |  21  ----------------------------<  100<H>  4.3   |  28  |  1.21    Ca    9.2      2020 21:10    TPro  8.0  /  Alb  3.6  /  TBili  0.2  /  DBili  x   /  AST  28  /  ALT  22  /  AlkPhos  70  02-20      CARDIAC MARKERS ( 2020 08:04 )  0.521 ng/mL / x     / x     / x     / x      CARDIAC MARKERS ( 2020 00:34 )  0.874 ng/mL / x     / x     / x     / x      CARDIAC MARKERS ( 2020 21:10 )  0.084 ng/mL / x     / 87 U/L / x     / x          Urinalysis Basic - ( 2020 21:10 )    Color: Yellow / Appearance: Clear / S.015 / pH: x  Gluc: x / Ketone: Negative  / Bili: Negative / Urobili: Negative mg/dL   Blood: x / Protein: 15 mg/dL / Nitrite: Negative   Leuk Esterase: Moderate / RBC: 0-2 /HPF / WBC 3-5   Sq Epi: x / Non Sq Epi: Occasional / Bacteria: Occasional        RADIOLOGY & ADDITIONAL TESTS:    Imaging Personally Reviewed:  cxr neg  CT head neg     EKG Personally Reviewed:  NSR

## 2020-02-21 NOTE — H&P ADULT - PROBLEM SELECTOR PLAN 4
pt completed current cycle of ibranze- will not resume at this time  c/w home dose of Letrozole   op f/u w/ onc

## 2020-02-21 NOTE — H&P ADULT - ASSESSMENT
64F w/PMH Breast CA st4 w/ mets to spine, depression, anxiety, presents c/o elevated bp and HA.   In ED, WBC 2.5, trop 0.08-->0.8, cxr neg, CT head neg, EKG NSR, UA +LE/bact- given IV cefepime.

## 2020-02-22 ENCOUNTER — TRANSCRIPTION ENCOUNTER (OUTPATIENT)
Age: 65
End: 2020-02-22

## 2020-02-22 VITALS
SYSTOLIC BLOOD PRESSURE: 98 MMHG | DIASTOLIC BLOOD PRESSURE: 37 MMHG | HEART RATE: 68 BPM | OXYGEN SATURATION: 98 % | TEMPERATURE: 98 F | RESPIRATION RATE: 17 BRPM

## 2020-02-22 LAB
CHOLEST SERPL-MCNC: 162 MG/DL — SIGNIFICANT CHANGE UP (ref 10–199)
CULTURE RESULTS: SIGNIFICANT CHANGE UP
HCT VFR BLD CALC: 33.5 % — LOW (ref 34.5–45)
HCV AB S/CO SERPL IA: 0.36 S/CO — SIGNIFICANT CHANGE UP (ref 0–0.99)
HCV AB SERPL-IMP: SIGNIFICANT CHANGE UP
HDLC SERPL-MCNC: 70 MG/DL — SIGNIFICANT CHANGE UP
HGB BLD-MCNC: 10.8 G/DL — LOW (ref 11.5–15.5)
LIPID PNL WITH DIRECT LDL SERPL: 77 MG/DL — SIGNIFICANT CHANGE UP
MCHC RBC-ENTMCNC: 31.4 PG — SIGNIFICANT CHANGE UP (ref 27–34)
MCHC RBC-ENTMCNC: 32.2 GM/DL — SIGNIFICANT CHANGE UP (ref 32–36)
MCV RBC AUTO: 97.4 FL — SIGNIFICANT CHANGE UP (ref 80–100)
PLATELET # BLD AUTO: 215 K/UL — SIGNIFICANT CHANGE UP (ref 150–400)
RBC # BLD: 3.44 M/UL — LOW (ref 3.8–5.2)
RBC # FLD: 13.9 % — SIGNIFICANT CHANGE UP (ref 10.3–14.5)
SPECIMEN SOURCE: SIGNIFICANT CHANGE UP
TOTAL CHOLESTEROL/HDL RATIO MEASUREMENT: 2.3 RATIO — LOW (ref 3.3–7.1)
TRIGL SERPL-MCNC: 72 MG/DL — SIGNIFICANT CHANGE UP (ref 10–149)
WBC # BLD: 2.37 K/UL — LOW (ref 3.8–10.5)
WBC # FLD AUTO: 2.37 K/UL — LOW (ref 3.8–10.5)

## 2020-02-22 PROCEDURE — 99217: CPT

## 2020-02-22 PROCEDURE — 93010 ELECTROCARDIOGRAM REPORT: CPT

## 2020-02-22 RX ORDER — TRANYLCYPROMINE SULFATE 10 MG/1
3 TABLET, FILM COATED ORAL
Qty: 0 | Refills: 0 | DISCHARGE
Start: 2020-02-22

## 2020-02-22 RX ORDER — TRANYLCYPROMINE SULFATE 10 MG/1
40 TABLET, FILM COATED ORAL
Qty: 0 | Refills: 0 | DISCHARGE

## 2020-02-22 RX ORDER — CEPHALEXIN 500 MG
1 CAPSULE ORAL
Qty: 4 | Refills: 0
Start: 2020-02-22 | End: 2020-02-23

## 2020-02-22 RX ADMIN — LETROZOLE 2.5 MILLIGRAM(S): 2.5 TABLET, FILM COATED ORAL at 11:50

## 2020-02-22 RX ADMIN — Medication 1000 UNIT(S): at 11:48

## 2020-02-22 RX ADMIN — Medication 500 MILLIGRAM(S): at 05:11

## 2020-02-22 RX ADMIN — Medication 2 TABLET(S): at 11:49

## 2020-02-22 RX ADMIN — Medication 81 MILLIGRAM(S): at 11:48

## 2020-02-22 RX ADMIN — TRANYLCYPROMINE SULFATE 30 MILLIGRAM(S): 10 TABLET, FILM COATED ORAL at 09:23

## 2020-02-22 NOTE — DISCHARGE NOTE PROVIDER - CARE PROVIDER_API CALL
Goldberg, Joel (MD)  Cardiovascular Disease; Internal Medicine  [address] [place] [address]  Phone: [phone]  Fax: [phone]  Follow Up Time:

## 2020-02-22 NOTE — DISCHARGE NOTE PROVIDER - HOSPITAL COURSE
64F w/PMH Breast CA st4 w/ mets to spine, depression, anxiety, presents c/o elevated bp.  Pt started having global HA, 10/10, squeezing in quality and decided to check her BP and found it to be  211/78.  She had her  repeat it several times w/ similar outcome.  She decided to take a nortriptyline that she had at home, after which her bp and HA improved.  She came to ED as she's never experienced this before and she does not have a h/o htn.  She denies cp, sob, blurry vision, n/v/d, abd pain, dysuria, f/c.  Pt endorses anxiety and increased stress recently.      In ED, WBC 2.5, trop 0.08-->0.8, cxr neg, CT head neg, EKG NSR, UA +LE/bact- given IV cefepime.  Pt admitted for HTN urgency, had some demand ischemia, recent negative ST and will f/u as OP with her Card    Pt stable today, ambualting, seen by Card Dr Thomas, stable for dc home. She can continue to take the nifedipine 10mg that she was taking at home. Pt attributes her HTN to dietary indiscretion while on Parnate. Pt will f/u with her PMD/Card, heme-onc, and psychiatrist.    Details below.        2/22 - no headache cp palps sob or lightheadedness, ambulating in the hallway; no dysuria    PHYSICAL EXAM:    GENERAL: pleasant, anxious to leave    HEAD:  Atraumatic, Normocephalic    EYES: EOMI, PERRLA, normal sclera    ENT: Moist mucous membranes    NECK: Supple, No JVD, no nuchal rigidity    CHEST/LUNG: Clear to auscultation bilaterally; No rales, rhonchi, wheezing, or rubs. Unlabored respirations    HEART: Regular rate and rhythm; No murmurs, rubs, or gallops    ABDOMEN: Bowel sounds present; Soft, Nontender, Nondistended. No hepatomegaly    EXTREMITIES:  no pitting bilaterally    NERVOUS SYSTEM:  Alert & Oriented X3, speech clear. No focal motor or sensory deficits        LABS: All Labs Reviewed:                            10.8     2.37  )-----------( 215      ( 22 Feb 2020 06:35 )               33.5    TPro  8.0  /  Alb  3.6  /  TBili  0.2  /  DBili  x   /  AST  28  /  ALT  22  /  AlkPhos  70  02-20    CARDIAC MARKERS ( 21 Feb 2020 08:04 )    0.521 ng/mL / x     / x     / x     / x        Tele rev by me shows sinus with brief runs of sinus tachy to 120s when anxious/ambulating but asymptomatic        A/P    ·  Problem: Hypertensive urgency.  Plan: bp improved now on prn hydralazine    monitor bp and if pt consistently requires PRN, consider d/c on OTC    Headache improved w/ bp improvement.     ·  Problem: Elevated troponin.  Plan: indeterminate    denies any cp, sob    likely demand ischemia 2/2 stress, anxiety     ·  Problem: Abnormal urinalysis.  Plan: given IV cefepime    pt asymptomatic    afebrile, no dysuria    will treat w/ PO keflex x 3 days total given mild neutropenia    ·  Problem: Breast CA.  Plan: pt completed current cycle of ibranze- will not resume at this time    c/w home dose of Letrozole     op f/u w/ onc.         discussed with team    discharge time - 60 mins

## 2020-02-22 NOTE — DISCHARGE NOTE PROVIDER - NSDCMRMEDTOKEN_GEN_ALL_CORE_FT
Allegra: 90 microgram(s) orally once a day, As Needed  Aspirin Enteric Coated 81 mg oral delayed release tablet: 1 tab(s) orally once a day  calcium carbonate 1000 mg oral tablet, chewable: orally once a day  cephalexin 500 mg oral capsule: 1 cap(s) orally every 12 hours  flunisolide 25 mcg/inh nasal spray: 2 puff(s) nasal 2 times a day  Ibrance 75 mg oral capsule: 1 cap(s) orally once a day  letrozole 2.5 mg oral tablet: 1 tab(s) orally once a day  Livalo 2 mg oral tablet: 1 tab(s) orally once a day (at bedtime)  NIFEdipine 10 mg oral capsule: 1 tab(s) orally once a day  Parnate 10 mg oral tablet: 3 tab(s) orally 2 times a day  Vitamin D3 1000 intl units (25 mcg) oral tablet: 1 tab(s) orally once a day

## 2020-02-22 NOTE — PROGRESS NOTE ADULT - ASSESSMENT
1. Headache, elevated BP, Hypertensive urgency. Her BP is normal now.  She took nifedipine at home.  Pt stress test an yr ago was normal reportedly.  She had been taking Parnate which can interract with multiple meds and foods.  Pt well aware of this.  She can have labetalol 100mg to carry with her in avi e this arises again.  She is going to see her cardiologist next week.   No CP or signs of decompensation.    2. +Troponins. Low level. Likely demand ischemia.  No CP. Recent negative ST.  Will f/u with cardiologist.    3. D/C planning.

## 2020-02-22 NOTE — DISCHARGE NOTE NURSING/CASE MANAGEMENT/SOCIAL WORK - PATIENT PORTAL LINK FT
You can access the FollowMyHealth Patient Portal offered by  by registering at the following website: [email or web address]. By joining [employer]’s FollowMyHealth portal, you will also be able to view your health information using other applications (apps) compatible with our system.

## 2020-02-22 NOTE — PROGRESS NOTE ADULT - SUBJECTIVE AND OBJECTIVE BOX
Cardiology Progress Note    HPI: 64F w/PMH Breast CA st4 w/ mets to spine, depression, anxiety, presents c/o elevated bp.  Pt started having global HA, 10/10, squeezing in quality and decided to check her BP and found it to be  211/78.  She had her  repeat it several times w/ similar outcome.  She decided to take a nortriptyline that she had at home, after which her bp and HA improved.  She came to ED as she's never experienced this before and she does not have a h/o htn.  She denies cp, sob, blurry vision, n/v/d, abd pain, dysuria, f/c.  Pt endorses anxiety and increased stress recently.    In ED, WBC 2.5, trop 0.08-->0.8, cxr neg, CT head neg, EKG NSR, UA +LE/bact- given IV cefepime.    Pt denies any CP or SOB. Pt states that she did not eat any thing out of ordinary.    . Pt wants to leave hospital ASAP.  No CP/SOB. BP stable. Not on tele. No new complaints.    PAST MEDICAL & SURGICAL HISTORY:  Breast CA - stage 4 w/ mets to spine   depression  anxiety  s/p spinal surgery    Allergies  erythromycin (Stomach Upset; Vomiting)    Social History:   lives w/   ind ADLs  denies smoking/etoh/drugs    FAMILY HISTORY:  unable to determine at this time d/t pt's anxiety     MEDICATIONS  (STANDING):  atorvastatin 10 milliGRAM(s) Oral at bedtime  calcium carbonate    500 mG (Tums) Chewable 2 Tablet(s) Chew daily  cephalexin 500 milliGRAM(s) Oral every 12 hours  cholecalciferol 1000 Unit(s) Oral daily  enoxaparin Injectable 40 milliGRAM(s) SubCutaneous daily  letrozole 2.5 milliGRAM(s) Oral daily    MEDICATIONS  (PRN):  acetaminophen   Tablet .. 650 milliGRAM(s) Oral every 6 hours PRN Temp greater or equal to 38C (100.4F), Mild Pain (1 - 3)  hydrALAZINE Injectable 10 milliGRAM(s) IV Push every 6 hours PRN sbp>150  ondansetron Injectable 4 milliGRAM(s) IV Push every 6 hours PRN Nausea  MEDICATIONS  (STANDING):  aspirin  chewable 81 milliGRAM(s) Oral daily  atorvastatin 10 milliGRAM(s) Oral at bedtime  calcium carbonate    500 mG (Tums) Chewable 2 Tablet(s) Chew daily  cephalexin 500 milliGRAM(s) Oral every 12 hours  cholecalciferol 1000 Unit(s) Oral daily  enoxaparin Injectable 40 milliGRAM(s) SubCutaneous daily  letrozole 2.5 milliGRAM(s) Oral daily  tranylcypromine 30 milliGRAM(s) Oral two times a day    REVIEW OF SYSTEMS:  CONSTITUTIONAL:    No fatigue, malaise, lethargy.  No fever or chills.  RESPIRATORY:  No cough.  No wheeze.  No hemoptysis.  No shortness of breath.  CARDIOVASCULAR:  No chest pains.  No palpitations. No shortness of breath, No orthopnea or PND.  GASTROINTESTINAL:  No abdominal pain.  No nausea or vomiting.    GENITOURINARY:    No hematuria.    MUSCULOSKELETAL:  No musculoskeletal pain.  No joint swelling.  No arthritis.  NEUROLOGICAL:  No tingling or numbness or weakness.    Vital Signs Last 24 Hrs  T(C): 36.6 (2020 10:14), Max: 36.8 (2020 23:39)  T(F): 97.9 (2020 10:14), Max: 98.2 (2020 23:39)  HR: 80 (2020 10:14) (68 - 96)  BP: 99/54 (2020 10:14) (99/54 - 147/66)  BP(mean): 77 (2020 01:04) (77 - 90)  RR: 18 (2020 10:14) (16 - 18)  SpO2: 95% (2020 10:14) (95% - 100%)    PHYSICAL EXAM-  Constitutional: The patient appears to be normal, well developed, well nourished and alert and oriented to time, place and person. The patient does not appear acutely ill.   Head: Head is normocephalic and atraumatic.    Neck: No jugular venous distention. No audible carotid bruits. There are strong carotid pulses bilaterally. No JVD.   Cardiovascular: Regular rate and rhythm without S3, S4. No murmurs or rubs are appreciated.    Respiratory: Breathsounds are normal. No rales. No wheezing.  Abdomen: Soft, nontender, nondistended with positive bowel sounds.    Extremity: No tenderness. No  pitting edema   Neurologic: The patient is alert and oriented.      INTERPRETATION OF TELEMETRY: SR     ECG: Sinus rythm , normal axis, no ST T changes.     I&O's Detail      LABS:                        11.5   2.53  )-----------( 234      ( 2020 21:10 )             36.1         141  |  107  |  21  ----------------------------<  100<H>  4.3   |  28  |  1.21    Ca    9.2      2020 21:10    TPro  8.0  /  Alb  3.6  /  TBili  0.2  /  DBili  x   /  AST  28  /  ALT  22  /  AlkPhos  70      CARDIAC MARKERS ( 2020 08:04 )  0.521 ng/mL / x     / x     / x     / x      CARDIAC MARKERS ( 2020 00:34 )  0.874 ng/mL / x     / x     / x     / x      CARDIAC MARKERS ( 2020 21:10 )  0.084 ng/mL / x     / 87 U/L / x     / x        Urinalysis Basic - ( 2020 21:10 )    Color: Yellow / Appearance: Clear / S.015 / pH: x  Gluc: x / Ketone: Negative  / Bili: Negative / Urobili: Negative mg/dL   Blood: x / Protein: 15 mg/dL / Nitrite: Negative   Leuk Esterase: Moderate / RBC: 0-2 /HPF / WBC 3-5   Sq Epi: x / Non Sq Epi: Occasional / Bacteria: Occasional    I&O's Summary    BNP  RADIOLOGY & ADDITIONAL STUDIES:  < from: Xray Chest 1 View-PORTABLE IMMEDIATE (20 @ 21:33) >    EXAM:  XR CHEST PORTABLE IMMED 1V                            PROCEDURE DATE:  2020        INTERPRETATION:  CHEST AP PORTABLE:    History: Breast cancer. Dizziness..    Date and time of exam: 2020 9:25 PM.    Comparison: No prior exam.    Technique: A single AP view of the chest was obtained.    Findings:  The lungs are clear. The heart and mediastinum are unremarkable.  No hilar abnormality is seen.  There is no evidence of pleural effusion. The visualized osseous structures demonstrate degenerative changes in the spine. Postoperative changes involving the lumbar spine. Hiatus hernia.    Impression:    No evidence of acute cardiopulmonary disease.

## 2020-02-26 LAB
CULTURE RESULTS: SIGNIFICANT CHANGE UP
CULTURE RESULTS: SIGNIFICANT CHANGE UP
SPECIMEN SOURCE: SIGNIFICANT CHANGE UP
SPECIMEN SOURCE: SIGNIFICANT CHANGE UP

## 2020-04-11 ENCOUNTER — EMERGENCY (EMERGENCY)
Facility: HOSPITAL | Age: 65
LOS: 0 days | Discharge: HOME | End: 2020-04-11
Attending: EMERGENCY MEDICINE
Payer: MEDICARE

## 2020-04-11 VITALS
SYSTOLIC BLOOD PRESSURE: 115 MMHG | DIASTOLIC BLOOD PRESSURE: 73 MMHG | RESPIRATION RATE: 18 BRPM | OXYGEN SATURATION: 95 % | TEMPERATURE: 98 F | HEART RATE: 103 BPM

## 2020-04-11 VITALS — WEIGHT: 138.01 LBS

## 2020-04-11 DIAGNOSIS — C78.7 SECONDARY MALIGNANT NEOPLASM OF LIVER AND INTRAHEPATIC BILE DUCT: ICD-10-CM

## 2020-04-11 DIAGNOSIS — C79.51 SECONDARY MALIGNANT NEOPLASM OF BONE: ICD-10-CM

## 2020-04-11 DIAGNOSIS — C50.919 MALIGNANT NEOPLASM OF UNSPECIFIED SITE OF UNSPECIFIED FEMALE BREAST: ICD-10-CM

## 2020-04-11 DIAGNOSIS — Z79.82 LONG TERM (CURRENT) USE OF ASPIRIN: ICD-10-CM

## 2020-04-11 DIAGNOSIS — R50.9 FEVER, UNSPECIFIED: ICD-10-CM

## 2020-04-11 DIAGNOSIS — R06.02 SHORTNESS OF BREATH: ICD-10-CM

## 2020-04-11 DIAGNOSIS — Z20.828 CONTACT WITH AND (SUSPECTED) EXPOSURE TO OTHER VIRAL COMMUNICABLE DISEASES: ICD-10-CM

## 2020-04-11 DIAGNOSIS — R05 COUGH: ICD-10-CM

## 2020-04-11 DIAGNOSIS — J84.9 INTERSTITIAL PULMONARY DISEASE, UNSPECIFIED: ICD-10-CM

## 2020-04-11 LAB
ALBUMIN SERPL ELPH-MCNC: 3.3 G/DL — SIGNIFICANT CHANGE UP (ref 3.3–5)
ALP SERPL-CCNC: 91 U/L — SIGNIFICANT CHANGE UP (ref 40–120)
ALT FLD-CCNC: 38 U/L — SIGNIFICANT CHANGE UP (ref 12–78)
ANION GAP SERPL CALC-SCNC: 2 MMOL/L — LOW (ref 5–17)
AST SERPL-CCNC: 32 U/L — SIGNIFICANT CHANGE UP (ref 15–37)
BILIRUB SERPL-MCNC: 0.6 MG/DL — SIGNIFICANT CHANGE UP (ref 0.2–1.2)
BUN SERPL-MCNC: 20 MG/DL — SIGNIFICANT CHANGE UP (ref 7–23)
CALCIUM SERPL-MCNC: 8.8 MG/DL — SIGNIFICANT CHANGE UP (ref 8.5–10.1)
CHLORIDE SERPL-SCNC: 109 MMOL/L — HIGH (ref 96–108)
CO2 SERPL-SCNC: 27 MMOL/L — SIGNIFICANT CHANGE UP (ref 22–31)
CREAT SERPL-MCNC: 1.26 MG/DL — SIGNIFICANT CHANGE UP (ref 0.5–1.3)
GLUCOSE SERPL-MCNC: 98 MG/DL — SIGNIFICANT CHANGE UP (ref 70–99)
HCT VFR BLD CALC: 37.1 % — SIGNIFICANT CHANGE UP (ref 34.5–45)
HGB BLD-MCNC: 12.1 G/DL — SIGNIFICANT CHANGE UP (ref 11.5–15.5)
MCHC RBC-ENTMCNC: 31.3 PG — SIGNIFICANT CHANGE UP (ref 27–34)
MCHC RBC-ENTMCNC: 32.6 GM/DL — SIGNIFICANT CHANGE UP (ref 32–36)
MCV RBC AUTO: 95.9 FL — SIGNIFICANT CHANGE UP (ref 80–100)
PLATELET # BLD AUTO: 150 K/UL — SIGNIFICANT CHANGE UP (ref 150–400)
POTASSIUM SERPL-MCNC: 3.7 MMOL/L — SIGNIFICANT CHANGE UP (ref 3.5–5.3)
POTASSIUM SERPL-SCNC: 3.7 MMOL/L — SIGNIFICANT CHANGE UP (ref 3.5–5.3)
PROT SERPL-MCNC: 7.2 GM/DL — SIGNIFICANT CHANGE UP (ref 6–8.3)
RBC # BLD: 3.87 M/UL — SIGNIFICANT CHANGE UP (ref 3.8–5.2)
RBC # FLD: 14.1 % — SIGNIFICANT CHANGE UP (ref 10.3–14.5)
SARS-COV-2 RNA SPEC QL NAA+PROBE: SIGNIFICANT CHANGE UP
SODIUM SERPL-SCNC: 138 MMOL/L — SIGNIFICANT CHANGE UP (ref 135–145)
WBC # BLD: 4.98 K/UL — SIGNIFICANT CHANGE UP (ref 3.8–10.5)
WBC # FLD AUTO: 4.98 K/UL — SIGNIFICANT CHANGE UP (ref 3.8–10.5)

## 2020-04-11 PROCEDURE — 36415 COLL VENOUS BLD VENIPUNCTURE: CPT

## 2020-04-11 PROCEDURE — 93010 ELECTROCARDIOGRAM REPORT: CPT

## 2020-04-11 PROCEDURE — 71275 CT ANGIOGRAPHY CHEST: CPT | Mod: 26

## 2020-04-11 PROCEDURE — 71275 CT ANGIOGRAPHY CHEST: CPT

## 2020-04-11 PROCEDURE — 99284 EMERGENCY DEPT VISIT MOD MDM: CPT | Mod: CS

## 2020-04-11 PROCEDURE — 85027 COMPLETE CBC AUTOMATED: CPT

## 2020-04-11 PROCEDURE — 99284 EMERGENCY DEPT VISIT MOD MDM: CPT | Mod: 25,CS

## 2020-04-11 PROCEDURE — 87635 SARS-COV-2 COVID-19 AMP PRB: CPT

## 2020-04-11 PROCEDURE — 93005 ELECTROCARDIOGRAM TRACING: CPT

## 2020-04-11 PROCEDURE — 80053 COMPREHEN METABOLIC PANEL: CPT

## 2020-04-11 NOTE — ED STATDOCS - PATIENT PORTAL LINK FT
You can access the FollowMyHealth Patient Portal offered by [facility] by registering at the following website: [email or web address]. By joining [employer]’s FollowMyHealth portal, you will also be able to view your health information using other applications (apps) compatible with our system.

## 2020-04-11 NOTE — ED ADULT TRIAGE NOTE - CHIEF COMPLAINT QUOTE
States she has had a dry cough x1 week. States she got up and suddenly was unable to breathe for about 3 minutes. Resolved on own and was able to catch breath. Denies any fevers. Currently on chemo for metastatic breast cancer. Self ambulated into ED with no distress noted. Breathing even and non labored, states it is hard to swallow.

## 2020-04-11 NOTE — ED STATDOCS - PROGRESS NOTE DETAILS
66 y/o F with PMH of metastatic breast CA on PO chemotherapy x 2 weeks presents with 1 week of cough and ?apneic episode today apx 1 hours PTA. States she contacted oncologist prior to apneic episode regarding cough, was advised likely adverse effect of medication. States she had sensation that she was unable to breath "like I was having a serious allergic reaction to something." States she has not come into contact with any foods or medicines she has known allergy to. Pt states she feels well at this time. Denies fever, chills, CP, palpitations, nausea, vomiting, abdominal pain. PE: Well appearing. Cardiac: s1s2, RRR. Lungs: CTAB. O2 saturation 100% on RA. Abdomen: NBS x4 soft nontender. A/P: Will assess for COVID-19, r/o PE, ACS. Plan for EKG, labs, CTA chest. Reassess. - Alo Louise PA-C Patient is refusing troponins. Pt had elevated troponin on prior ED visit, admitted and evaluated by cardiology. Reported troponin elevation likely 2/2 demand ischemia from hypertensive emergency. /76 on this visit. Pt was also advised by her oncologist that her chemotherapy regiment can cause elevated troponin. Will dc troponin at this time, continue with cbc, cmp and CTA chest. - Alo Louise PA-C Reviewed CT findings with Dr. Harry.  Neg Pulmonary embolism.  There is bilateral left > right lower lobe interstitial prominence, ? edema vs infection.  Covid testing was negative in ED.  Told pt it could be falsely negative and have Covid vs edema from Chemotherapy meds.  Will f/u with PMD and Oncologist.  Also large hiatal hernia with stomach contents intrathoracic location.  Stable pulm nodule.  Vitals WNL at TN.  Will FU as outpt.  Ramila Means PA-C

## 2020-04-11 NOTE — ED STATDOCS - ATTENDING CONTRIBUTION TO CARE
I, Eid Tomlin, performed the initial face to face bedside interview with this patient regarding history of present illness, review of symptoms and relevant past medical, social and family history.  I completed an independent physical examination.  I was the initial provider who evaluated this patient. I have signed out the follow up of any pending tests (i.e. labs, radiological studies) to the ACP.  I have communicated the patient’s plan of care and disposition with the ACP.  The history, relevant review of systems, past medical and surgical history, medical decision making, and physical examination was documented by the scribe in my presence and I attest to the accuracy of the documentation.

## 2020-04-11 NOTE — ED STATDOCS - OBJECTIVE STATEMENT
64 y/o female with PMHx of breast CA with mets to spine and liver currently undergoing oral Xeloda chemotherapy treatment started 2 weeks ago presents to the ED c/o 1 week of cough, fatigue, subjective fever, chills. Pt assumed that they were side effects of chemotherapy drug as suggested by oncologist. Lives with  who has not left house. 1 hour PTA, sudden episode of severe SOB, dizziness to the point of almost passing out while walking. Pt lowered herself to ground to crawl. c/o throat tightness, mild SOB. Did not take any medications for sx.

## 2020-08-21 NOTE — CONSULT NOTE ADULT - CONSULT REASON
Continue with wrist elbow and shoulder pendulum exercises. We will start therapy in 2 weeks. At that time he can begin coming out of your sling.   We will see you back in 1 month to assess her progress and get another x-ray to assess healing, call us if you have any issues
Hypertensive urgency

## 2021-04-29 ENCOUNTER — APPOINTMENT (OUTPATIENT)
Dept: DERMATOLOGY | Facility: CLINIC | Age: 66
End: 2021-04-29
Payer: MEDICARE

## 2021-04-29 ENCOUNTER — NON-APPOINTMENT (OUTPATIENT)
Age: 66
End: 2021-04-29

## 2021-04-29 DIAGNOSIS — Z85.3 PERSONAL HISTORY OF MALIGNANT NEOPLASM OF BREAST: ICD-10-CM

## 2021-04-29 DIAGNOSIS — Z00.00 ENCOUNTER FOR GENERAL ADULT MEDICAL EXAMINATION W/OUT ABNORMAL FINDINGS: ICD-10-CM

## 2021-04-29 DIAGNOSIS — Z78.9 OTHER SPECIFIED HEALTH STATUS: ICD-10-CM

## 2021-04-29 PROCEDURE — 99203 OFFICE O/P NEW LOW 30 MIN: CPT

## 2021-04-29 NOTE — PHYSICAL EXAM
[Alert] : alert [Oriented x 3] : ~L oriented x 3 [Well Nourished] : well nourished [Full Body Skin Exam Performed] : performed [FreeTextEntry3] : A full skin exam was performed including the scalp, face (including the lips, ears, nose and eyes), neck, chest, breasts, abdomen, back, buttocks, upper extremities and lower extremities.  The patient declined examination of the genitalia.  \par The exam revealed the following benign growths:\par Lentigines.\par Cherry angioma.\par \par

## 2021-04-29 NOTE — HISTORY OF PRESENT ILLNESS
[FreeTextEntry1] : Patient presents for skin examination. [de-identified] : Denies new, changing, bleeding or tender lesions on the skin over the past year.\par

## 2022-06-21 ENCOUNTER — APPOINTMENT (OUTPATIENT)
Dept: DERMATOLOGY | Facility: CLINIC | Age: 67
End: 2022-06-21
Payer: MEDICARE

## 2022-06-21 DIAGNOSIS — L81.0 POSTINFLAMMATORY HYPERPIGMENTATION: ICD-10-CM

## 2022-06-21 PROCEDURE — 99213 OFFICE O/P EST LOW 20 MIN: CPT

## 2022-06-21 NOTE — ASSESSMENT
[FreeTextEntry1] : A complete skin examination was performed.  There is no evidence of skin cancer.  We discussed the importance of photoprotection, including the use of hats, protective clothing and sunscreens with an SPF of at least 30.  Sun avoidance was also discussed.  The ABCDE's of melanoma was discussed.  Regular skin exams recommended.\par \par P.I. hyperpig.\par Education.\par

## 2022-06-21 NOTE — HISTORY OF PRESENT ILLNESS
[FreeTextEntry1] : Patient presents for skin examination. [de-identified] : Dark patches of the legs, persistent for years, without change.\par She notes hx of subdural hematoma, tx'ed by surgery.

## 2022-06-21 NOTE — PHYSICAL EXAM
[Alert] : alert [Oriented x 3] : ~L oriented x 3 [Well Nourished] : well nourished [Full Body Skin Exam Performed] : performed [FreeTextEntry3] : A full skin exam was performed including the scalp, face, neck, chest, abdomen, back, buttocks, upper extremities and lower extremities.  The patient declined examination of the breasts and genitalia.  \par The exam did show the following benign growths:\par Lentigines.\par Cherry angioma.\par \par hyperpigmented patches, right superior calf, and medial thighs.

## 2023-01-01 ENCOUNTER — NON-APPOINTMENT (OUTPATIENT)
Age: 68
End: 2023-01-01

## 2023-01-01 ENCOUNTER — APPOINTMENT (OUTPATIENT)
Dept: CARDIOLOGY | Facility: CLINIC | Age: 68
End: 2023-01-01
Payer: MEDICARE

## 2023-01-01 ENCOUNTER — EMERGENCY (EMERGENCY)
Facility: HOSPITAL | Age: 68
LOS: 1 days | Discharge: HOME | End: 2023-01-01
Attending: EMERGENCY MEDICINE
Payer: MEDICARE

## 2023-01-01 ENCOUNTER — APPOINTMENT (OUTPATIENT)
Dept: DERMATOLOGY | Facility: CLINIC | Age: 68
End: 2023-01-01
Payer: MEDICARE

## 2023-01-01 ENCOUNTER — TRANSCRIPTION ENCOUNTER (OUTPATIENT)
Age: 68
End: 2023-01-01

## 2023-01-01 VITALS
OXYGEN SATURATION: 100 % | HEART RATE: 75 BPM | DIASTOLIC BLOOD PRESSURE: 70 MMHG | TEMPERATURE: 98 F | RESPIRATION RATE: 18 BRPM | SYSTOLIC BLOOD PRESSURE: 116 MMHG

## 2023-01-01 VITALS
WEIGHT: 148 LBS | DIASTOLIC BLOOD PRESSURE: 58 MMHG | OXYGEN SATURATION: 96 % | SYSTOLIC BLOOD PRESSURE: 94 MMHG | BODY MASS INDEX: 29.06 KG/M2 | HEIGHT: 60 IN | HEART RATE: 85 BPM

## 2023-01-01 VITALS — SYSTOLIC BLOOD PRESSURE: 90 MMHG | DIASTOLIC BLOOD PRESSURE: 52 MMHG

## 2023-01-01 VITALS — WEIGHT: 149.91 LBS | HEIGHT: 63 IN

## 2023-01-01 DIAGNOSIS — D18.01 HEMANGIOMA OF SKIN AND SUBCUTANEOUS TISSUE: ICD-10-CM

## 2023-01-01 DIAGNOSIS — Z78.9 OTHER SPECIFIED HEALTH STATUS: ICD-10-CM

## 2023-01-01 DIAGNOSIS — Z88.8 ALLERGY STATUS TO OTHER DRUGS, MEDICAMENTS AND BIOLOGICAL SUBSTANCES: ICD-10-CM

## 2023-01-01 DIAGNOSIS — Z91.048 OTHER NONMEDICINAL SUBSTANCE ALLERGY STATUS: ICD-10-CM

## 2023-01-01 DIAGNOSIS — I42.7 CARDIOMYOPATHY DUE TO DRUG AND EXTERNAL AGENT: ICD-10-CM

## 2023-01-01 DIAGNOSIS — L81.4 OTHER MELANIN HYPERPIGMENTATION: ICD-10-CM

## 2023-01-01 DIAGNOSIS — Z82.49 FAMILY HISTORY OF ISCHEMIC HEART DISEASE AND OTHER DISEASES OF THE CIRCULATORY SYSTEM: ICD-10-CM

## 2023-01-01 DIAGNOSIS — Z91.018 ALLERGY TO OTHER FOODS: ICD-10-CM

## 2023-01-01 DIAGNOSIS — R42 DIZZINESS AND GIDDINESS: ICD-10-CM

## 2023-01-01 DIAGNOSIS — E78.5 HYPERLIPIDEMIA, UNSPECIFIED: ICD-10-CM

## 2023-01-01 DIAGNOSIS — Z91.040 LATEX ALLERGY STATUS: ICD-10-CM

## 2023-01-01 DIAGNOSIS — Z88.5 ALLERGY STATUS TO NARCOTIC AGENT: ICD-10-CM

## 2023-01-01 DIAGNOSIS — E87.6 HYPOKALEMIA: ICD-10-CM

## 2023-01-01 DIAGNOSIS — I35.1 NONRHEUMATIC AORTIC (VALVE) INSUFFICIENCY: ICD-10-CM

## 2023-01-01 DIAGNOSIS — Z92.21 PERSONAL HISTORY OF ANTINEOPLASTIC CHEMOTHERAPY: ICD-10-CM

## 2023-01-01 DIAGNOSIS — Z88.1 ALLERGY STATUS TO OTHER ANTIBIOTIC AGENTS STATUS: ICD-10-CM

## 2023-01-01 DIAGNOSIS — Z86.79 PERSONAL HISTORY OF OTHER DISEASES OF THE CIRCULATORY SYSTEM: ICD-10-CM

## 2023-01-01 DIAGNOSIS — R60.0 LOCALIZED EDEMA: ICD-10-CM

## 2023-01-01 DIAGNOSIS — D61.818 OTHER PANCYTOPENIA: ICD-10-CM

## 2023-01-01 DIAGNOSIS — I34.1 NONRHEUMATIC MITRAL (VALVE) PROLAPSE: ICD-10-CM

## 2023-01-01 LAB
ABO RH CONFIRMATION: SIGNIFICANT CHANGE UP
ABO RH CONFIRMATION: SIGNIFICANT CHANGE UP
ALBUMIN SERPL ELPH-MCNC: 2.3 G/DL — LOW (ref 3.3–5)
ALBUMIN SERPL ELPH-MCNC: 2.3 G/DL — LOW (ref 3.3–5)
ALP SERPL-CCNC: 340 U/L — HIGH (ref 40–120)
ALP SERPL-CCNC: 340 U/L — HIGH (ref 40–120)
ALT FLD-CCNC: 58 U/L — SIGNIFICANT CHANGE UP (ref 12–78)
ALT FLD-CCNC: 58 U/L — SIGNIFICANT CHANGE UP (ref 12–78)
ANION GAP SERPL CALC-SCNC: 4 MMOL/L — LOW (ref 5–17)
ANION GAP SERPL CALC-SCNC: 4 MMOL/L — LOW (ref 5–17)
ANISOCYTOSIS BLD QL: SLIGHT — SIGNIFICANT CHANGE UP
ANISOCYTOSIS BLD QL: SLIGHT — SIGNIFICANT CHANGE UP
APTT BLD: 25.7 SEC — SIGNIFICANT CHANGE UP (ref 24.5–35.6)
APTT BLD: 25.7 SEC — SIGNIFICANT CHANGE UP (ref 24.5–35.6)
AST SERPL-CCNC: 60 U/L — HIGH (ref 15–37)
AST SERPL-CCNC: 60 U/L — HIGH (ref 15–37)
BASOPHILS # BLD AUTO: 0 K/UL — SIGNIFICANT CHANGE UP (ref 0–0.2)
BASOPHILS # BLD AUTO: 0 K/UL — SIGNIFICANT CHANGE UP (ref 0–0.2)
BASOPHILS NFR BLD AUTO: 0 % — SIGNIFICANT CHANGE UP (ref 0–2)
BASOPHILS NFR BLD AUTO: 0 % — SIGNIFICANT CHANGE UP (ref 0–2)
BILIRUB SERPL-MCNC: 0.2 MG/DL — SIGNIFICANT CHANGE UP (ref 0.2–1.2)
BILIRUB SERPL-MCNC: 0.2 MG/DL — SIGNIFICANT CHANGE UP (ref 0.2–1.2)
BLD GP AB SCN SERPL QL: SIGNIFICANT CHANGE UP
BLD GP AB SCN SERPL QL: SIGNIFICANT CHANGE UP
BUN SERPL-MCNC: 26 MG/DL — HIGH (ref 7–23)
BUN SERPL-MCNC: 26 MG/DL — HIGH (ref 7–23)
CALCIUM SERPL-MCNC: 8.6 MG/DL — SIGNIFICANT CHANGE UP (ref 8.5–10.1)
CALCIUM SERPL-MCNC: 8.6 MG/DL — SIGNIFICANT CHANGE UP (ref 8.5–10.1)
CHLORIDE SERPL-SCNC: 113 MMOL/L — HIGH (ref 96–108)
CHLORIDE SERPL-SCNC: 113 MMOL/L — HIGH (ref 96–108)
CO2 SERPL-SCNC: 28 MMOL/L — SIGNIFICANT CHANGE UP (ref 22–31)
CO2 SERPL-SCNC: 28 MMOL/L — SIGNIFICANT CHANGE UP (ref 22–31)
CREAT SERPL-MCNC: 0.93 MG/DL — SIGNIFICANT CHANGE UP (ref 0.5–1.3)
CREAT SERPL-MCNC: 0.93 MG/DL — SIGNIFICANT CHANGE UP (ref 0.5–1.3)
EGFR: 67 ML/MIN/1.73M2 — SIGNIFICANT CHANGE UP
EGFR: 67 ML/MIN/1.73M2 — SIGNIFICANT CHANGE UP
EOSINOPHIL # BLD AUTO: 0 K/UL — SIGNIFICANT CHANGE UP (ref 0–0.5)
EOSINOPHIL # BLD AUTO: 0 K/UL — SIGNIFICANT CHANGE UP (ref 0–0.5)
EOSINOPHIL NFR BLD AUTO: 0 % — SIGNIFICANT CHANGE UP (ref 0–6)
EOSINOPHIL NFR BLD AUTO: 0 % — SIGNIFICANT CHANGE UP (ref 0–6)
GLUCOSE SERPL-MCNC: 95 MG/DL — SIGNIFICANT CHANGE UP (ref 70–99)
GLUCOSE SERPL-MCNC: 95 MG/DL — SIGNIFICANT CHANGE UP (ref 70–99)
HCT VFR BLD CALC: 20 % — CRITICAL LOW (ref 34.5–45)
HCT VFR BLD CALC: 20 % — CRITICAL LOW (ref 34.5–45)
HGB BLD-MCNC: 6.3 G/DL — CRITICAL LOW (ref 11.5–15.5)
HGB BLD-MCNC: 6.3 G/DL — CRITICAL LOW (ref 11.5–15.5)
HYPOCHROMIA BLD QL: SLIGHT — SIGNIFICANT CHANGE UP
HYPOCHROMIA BLD QL: SLIGHT — SIGNIFICANT CHANGE UP
INR BLD: 0.87 RATIO — SIGNIFICANT CHANGE UP (ref 0.85–1.18)
INR BLD: 0.87 RATIO — SIGNIFICANT CHANGE UP (ref 0.85–1.18)
LYMPHOCYTES # BLD AUTO: 0.72 K/UL — LOW (ref 1–3.3)
LYMPHOCYTES # BLD AUTO: 0.72 K/UL — LOW (ref 1–3.3)
LYMPHOCYTES # BLD AUTO: 50 % — HIGH (ref 13–44)
LYMPHOCYTES # BLD AUTO: 50 % — HIGH (ref 13–44)
MANUAL SMEAR VERIFICATION: SIGNIFICANT CHANGE UP
MANUAL SMEAR VERIFICATION: SIGNIFICANT CHANGE UP
MCHC RBC-ENTMCNC: 27 PG — SIGNIFICANT CHANGE UP (ref 27–34)
MCHC RBC-ENTMCNC: 27 PG — SIGNIFICANT CHANGE UP (ref 27–34)
MCHC RBC-ENTMCNC: 31.5 GM/DL — LOW (ref 32–36)
MCHC RBC-ENTMCNC: 31.5 GM/DL — LOW (ref 32–36)
MCV RBC AUTO: 85.8 FL — SIGNIFICANT CHANGE UP (ref 80–100)
MCV RBC AUTO: 85.8 FL — SIGNIFICANT CHANGE UP (ref 80–100)
MONOCYTES # BLD AUTO: 0.06 K/UL — SIGNIFICANT CHANGE UP (ref 0–0.9)
MONOCYTES # BLD AUTO: 0.06 K/UL — SIGNIFICANT CHANGE UP (ref 0–0.9)
MONOCYTES NFR BLD AUTO: 4 % — SIGNIFICANT CHANGE UP (ref 2–14)
MONOCYTES NFR BLD AUTO: 4 % — SIGNIFICANT CHANGE UP (ref 2–14)
NEUTROPHILS # BLD AUTO: 0.66 K/UL — LOW (ref 1.8–7.4)
NEUTROPHILS # BLD AUTO: 0.66 K/UL — LOW (ref 1.8–7.4)
NEUTROPHILS NFR BLD AUTO: 46 % — SIGNIFICANT CHANGE UP (ref 43–77)
NEUTROPHILS NFR BLD AUTO: 46 % — SIGNIFICANT CHANGE UP (ref 43–77)
NRBC # BLD: 0 /100 — SIGNIFICANT CHANGE UP (ref 0–0)
NRBC # BLD: 0 /100 — SIGNIFICANT CHANGE UP (ref 0–0)
NRBC # BLD: SIGNIFICANT CHANGE UP /100 WBCS (ref 0–0)
NRBC # BLD: SIGNIFICANT CHANGE UP /100 WBCS (ref 0–0)
PLAT MORPH BLD: NORMAL — SIGNIFICANT CHANGE UP
PLAT MORPH BLD: NORMAL — SIGNIFICANT CHANGE UP
PLATELET # BLD AUTO: 28 K/UL — LOW (ref 150–400)
PLATELET # BLD AUTO: 28 K/UL — LOW (ref 150–400)
PLATELET COUNT - ESTIMATE: ABNORMAL
PLATELET COUNT - ESTIMATE: ABNORMAL
POTASSIUM SERPL-MCNC: 3.8 MMOL/L — SIGNIFICANT CHANGE UP (ref 3.5–5.3)
POTASSIUM SERPL-MCNC: 3.8 MMOL/L — SIGNIFICANT CHANGE UP (ref 3.5–5.3)
POTASSIUM SERPL-SCNC: 3.8 MMOL/L — SIGNIFICANT CHANGE UP (ref 3.5–5.3)
POTASSIUM SERPL-SCNC: 3.8 MMOL/L — SIGNIFICANT CHANGE UP (ref 3.5–5.3)
PROT SERPL-MCNC: 6.4 GM/DL — SIGNIFICANT CHANGE UP (ref 6–8.3)
PROT SERPL-MCNC: 6.4 GM/DL — SIGNIFICANT CHANGE UP (ref 6–8.3)
PROTHROM AB SERPL-ACNC: 9.9 SEC — SIGNIFICANT CHANGE UP (ref 9.5–13)
PROTHROM AB SERPL-ACNC: 9.9 SEC — SIGNIFICANT CHANGE UP (ref 9.5–13)
RBC # BLD: 2.33 M/UL — LOW (ref 3.8–5.2)
RBC # BLD: 2.33 M/UL — LOW (ref 3.8–5.2)
RBC # FLD: 18 % — HIGH (ref 10.3–14.5)
RBC # FLD: 18 % — HIGH (ref 10.3–14.5)
RBC BLD AUTO: ABNORMAL
RBC BLD AUTO: ABNORMAL
SODIUM SERPL-SCNC: 145 MMOL/L — SIGNIFICANT CHANGE UP (ref 135–145)
SODIUM SERPL-SCNC: 145 MMOL/L — SIGNIFICANT CHANGE UP (ref 135–145)
WBC # BLD: 1.44 K/UL — LOW (ref 3.8–10.5)
WBC # BLD: 1.44 K/UL — LOW (ref 3.8–10.5)
WBC # FLD AUTO: 1.44 K/UL — LOW (ref 3.8–10.5)
WBC # FLD AUTO: 1.44 K/UL — LOW (ref 3.8–10.5)

## 2023-01-01 PROCEDURE — 99285 EMERGENCY DEPT VISIT HI MDM: CPT | Mod: 25

## 2023-01-01 PROCEDURE — 85730 THROMBOPLASTIN TIME PARTIAL: CPT

## 2023-01-01 PROCEDURE — 86900 BLOOD TYPING SEROLOGIC ABO: CPT

## 2023-01-01 PROCEDURE — 86920 COMPATIBILITY TEST SPIN: CPT

## 2023-01-01 PROCEDURE — 80053 COMPREHEN METABOLIC PANEL: CPT

## 2023-01-01 PROCEDURE — 86850 RBC ANTIBODY SCREEN: CPT

## 2023-01-01 PROCEDURE — 86901 BLOOD TYPING SEROLOGIC RH(D): CPT

## 2023-01-01 PROCEDURE — 93005 ELECTROCARDIOGRAM TRACING: CPT

## 2023-01-01 PROCEDURE — 99214 OFFICE O/P EST MOD 30 MIN: CPT

## 2023-01-01 PROCEDURE — 36430 TRANSFUSION BLD/BLD COMPNT: CPT

## 2023-01-01 PROCEDURE — 36415 COLL VENOUS BLD VENIPUNCTURE: CPT

## 2023-01-01 PROCEDURE — 85025 COMPLETE CBC W/AUTO DIFF WBC: CPT

## 2023-01-01 PROCEDURE — 93010 ELECTROCARDIOGRAM REPORT: CPT

## 2023-01-01 PROCEDURE — 93000 ELECTROCARDIOGRAM COMPLETE: CPT

## 2023-01-01 PROCEDURE — 99284 EMERGENCY DEPT VISIT MOD MDM: CPT

## 2023-01-01 PROCEDURE — 85610 PROTHROMBIN TIME: CPT

## 2023-01-01 PROCEDURE — 99204 OFFICE O/P NEW MOD 45 MIN: CPT

## 2023-01-01 PROCEDURE — 99213 OFFICE O/P EST LOW 20 MIN: CPT

## 2023-01-01 PROCEDURE — P9016: CPT

## 2023-01-01 RX ORDER — PROCHLORPERAZINE MALEATE 10 MG/1
10 TABLET, FILM COATED ORAL EVERY 6 HOURS
Refills: 0 | Status: ACTIVE | COMMUNITY

## 2023-01-01 RX ORDER — SOD,AMMONIUM,POTASSIUM LACTATE
1 CREAM (GRAM) TOPICAL
Refills: 0 | DISCHARGE

## 2023-01-01 RX ORDER — CHOLECALCIFEROL (VITAMIN D3) 125 MCG
1 CAPSULE ORAL
Qty: 0 | Refills: 0 | DISCHARGE

## 2023-01-01 RX ORDER — TRANYLCYPROMINE SULFATE 10 MG/1
10 TABLET, FILM COATED ORAL DAILY
Refills: 0 | Status: ACTIVE | COMMUNITY

## 2023-01-01 RX ORDER — DOCUSATE SODIUM 100 MG/1
CAPSULE ORAL
Refills: 0 | Status: ACTIVE | COMMUNITY

## 2023-01-01 RX ORDER — LETROZOLE 2.5 MG/1
1 TABLET, FILM COATED ORAL
Qty: 0 | Refills: 0 | DISCHARGE

## 2023-01-01 RX ORDER — TRANYLCYPROMINE SULFATE 10 MG/1
TABLET, FILM COATED ORAL
Refills: 0 | Status: DISCONTINUED | COMMUNITY
End: 2023-01-01

## 2023-01-01 RX ORDER — LIDOCAINE 4 G/100G
0 CREAM TOPICAL
Refills: 0 | DISCHARGE

## 2023-01-01 RX ORDER — PROCHLORPERAZINE MALEATE 5 MG
1 TABLET ORAL
Refills: 0 | DISCHARGE

## 2023-01-01 RX ORDER — CALCIUM CARBONATE 500(1250)
0 TABLET ORAL
Qty: 0 | Refills: 0 | DISCHARGE

## 2023-01-01 RX ORDER — FEXOFENADINE HCL 180 MG
180 TABLET ORAL
Refills: 0 | Status: ACTIVE | COMMUNITY

## 2023-01-01 RX ORDER — ASPIRIN/CALCIUM CARB/MAGNESIUM 324 MG
1 TABLET ORAL
Qty: 0 | Refills: 0 | DISCHARGE

## 2023-01-01 RX ORDER — SUCRALFATE 1 G/1
1 TABLET ORAL
Refills: 0 | Status: ACTIVE | COMMUNITY

## 2023-01-01 RX ORDER — LORATADINE 10 MG
17 TABLET,DISINTEGRATING ORAL
Refills: 0 | Status: ACTIVE | COMMUNITY

## 2023-01-01 RX ORDER — ESOMEPRAZOLE MAGNESIUM 40 MG/1
1 CAPSULE, DELAYED RELEASE ORAL
Refills: 0 | DISCHARGE

## 2023-01-01 RX ORDER — DEXAMETHASONE 0.5 MG/5ML
10 ELIXIR ORAL
Refills: 0 | DISCHARGE

## 2023-01-01 RX ORDER — DEXAMETHASONE 0.5 MG/5ML
3 ELIXIR ORAL
Refills: 0 | DISCHARGE

## 2023-01-01 RX ORDER — FAM-TRASTUZUMAB DERUXTECAN-NXKI 100 MG/5ML
100 INJECTION, POWDER, LYOPHILIZED, FOR SOLUTION INTRAVENOUS
Refills: 0 | Status: ACTIVE | COMMUNITY

## 2023-01-01 RX ORDER — PITAVASTATIN CALCIUM 4.18 MG/1
TABLET, FILM COATED ORAL
Refills: 0 | Status: DISCONTINUED | COMMUNITY
End: 2023-01-01

## 2023-01-01 RX ORDER — TRANYLCYPROMINE SULFATE 10 MG/1
4 TABLET, FILM COATED ORAL
Refills: 0 | DISCHARGE

## 2023-01-01 RX ORDER — FLUNISOLIDE 25 MCG
2 AEROSOL, SPRAY (ML) NASAL
Qty: 0 | Refills: 0 | DISCHARGE

## 2023-01-01 RX ORDER — ALPRAZOLAM 0.5 MG/1
0.5 TABLET ORAL
Refills: 0 | Status: ACTIVE | COMMUNITY

## 2023-01-01 RX ORDER — ALPRAZOLAM 0.25 MG
1 TABLET ORAL
Refills: 0 | DISCHARGE

## 2023-01-01 RX ORDER — POTASSIUM CHLORIDE 750 MG/1
10 TABLET, FILM COATED, EXTENDED RELEASE ORAL TWICE DAILY
Qty: 60 | Refills: 2 | Status: ACTIVE | COMMUNITY
Start: 2023-01-01 | End: 1900-01-01

## 2023-01-01 RX ORDER — EPINEPHRINE 0.3 MG/.3ML
0.3 MG/0.3ML INJECTION INTRAMUSCULAR
Refills: 0 | Status: ACTIVE | COMMUNITY

## 2023-01-01 RX ORDER — PITAVASTATIN CALCIUM 1.04 MG/1
1 TABLET, FILM COATED ORAL
Qty: 0 | Refills: 0 | DISCHARGE

## 2023-01-01 RX ORDER — NIFEDIPINE 30 MG
1 TABLET, EXTENDED RELEASE 24 HR ORAL
Qty: 0 | Refills: 0 | DISCHARGE

## 2023-01-01 RX ORDER — FEXOFENADINE HCL 30 MG
90 TABLET ORAL
Qty: 0 | Refills: 0 | DISCHARGE

## 2023-01-01 RX ORDER — PALBOCICLIB 100 MG/1
1 CAPSULE ORAL
Qty: 0 | Refills: 0 | DISCHARGE

## 2023-01-01 RX ORDER — ESOMEPRAZOLE MAGNESIUM 40 MG/1
40 CAPSULE, DELAYED RELEASE ORAL
Refills: 0 | Status: ACTIVE | COMMUNITY

## 2023-01-01 RX ORDER — KETOCONAZOLE 20 MG/G
1 AEROSOL, FOAM TOPICAL
Refills: 0 | DISCHARGE

## 2023-06-22 PROBLEM — L81.4 LENTIGINES: Status: ACTIVE | Noted: 2021-04-29

## 2023-06-22 PROBLEM — D18.01 CHERRY ANGIOMA: Status: ACTIVE | Noted: 2021-04-29

## 2023-06-22 NOTE — PHYSICAL EXAM
[Alert] : alert [Oriented x 3] : ~L oriented x 3 [Well Nourished] : well nourished [Full Body Skin Exam Performed] : performed [FreeTextEntry3] : A full skin exam was performed including the scalp, face, neck, chest, abdomen, back, buttocks, upper extremities and lower extremities.  The patient declined examination of the breasts and genitalia.  \par The exam did show the following benign growths:\par Lentigines.\par Cherry angioma.\par

## 2023-06-22 NOTE — ASSESSMENT
[FreeTextEntry1] : A complete skin examination was performed.  There is no evidence of skin cancer.  We discussed the importance of photoprotection, including the use of hats, protective clothing and sunscreens with an SPF of at least 30.  Sun avoidance was also discussed.  The ABCDE's of melanoma was discussed.  Regular skin exams recommended.\par \par Patient with stage IV CA x 5 years, holding her own, on chemo - multiple regimens over the years.

## 2023-06-22 NOTE — HISTORY OF PRESENT ILLNESS
[FreeTextEntry1] : Patient presents for skin examination. [de-identified] : Denies new, changing, bleeding or tender lesions on the skin over the past year.\par

## 2023-08-16 PROBLEM — I35.1 MILD AORTIC INSUFFICIENCY: Status: ACTIVE | Noted: 2023-01-01

## 2023-08-16 PROBLEM — E87.6 HYPOKALEMIA: Status: ACTIVE | Noted: 2023-01-01

## 2023-08-16 PROBLEM — Z82.49 FAMILY HISTORY OF MYOCARDIAL INFARCTION: Status: ACTIVE | Noted: 2023-01-01

## 2023-08-16 PROBLEM — I42.7: Status: ACTIVE | Noted: 2023-01-01

## 2023-08-16 PROBLEM — Z82.49 FAMILY HISTORY OF CORONARY ARTERY DISEASE: Status: ACTIVE | Noted: 2023-01-01

## 2023-08-16 PROBLEM — R60.0 LOWER EXTREMITY EDEMA: Status: ACTIVE | Noted: 2023-01-01

## 2023-08-16 PROBLEM — Z78.9 CONSUMES ALCOHOL OCCASIONALLY: Status: ACTIVE | Noted: 2023-01-01

## 2023-08-16 PROBLEM — I34.1 MVP (MITRAL VALVE PROLAPSE): Status: ACTIVE | Noted: 2023-01-01

## 2023-08-16 PROBLEM — Z92.21 HISTORY OF CHEMOTHERAPY: Status: RESOLVED | Noted: 2023-01-01 | Resolved: 2023-01-01

## 2023-08-16 PROBLEM — Z86.79 HISTORY OF SUBDURAL HEMATOMA: Status: RESOLVED | Noted: 2023-01-01 | Resolved: 2023-01-01

## 2023-08-16 PROBLEM — E78.5 HYPERLIPIDEMIA, UNSPECIFIED HYPERLIPIDEMIA TYPE: Status: ACTIVE | Noted: 2023-01-01

## 2023-08-16 NOTE — HISTORY OF PRESENT ILLNESS
[FreeTextEntry1] : Patient is a 68-year-old white female well-known to the practice who has cardiovascular issues including hyperlipidemia, mild aortic insufficiency, mitral valve prolapse who is followed here on a regular basis as she continues on cardiotoxic chemotherapy and HER2 antagonist for stage IV breast cancer without a primary.  Patient presents the office today noting that over the last several months since her chemotherapy is changed she has developed slight increase in lower extremity edema, excessive fatigue and breathlessness on exertion and her performance status is dropped from a 6 to a 7 with regards to her fatigue, decreased energy, and sleepiness.  She now rates her performance level as a  4 previously has been approximately 6.    Patient is followed by Dr. Mathews at [facility] who had a complete set of blood work obtained recently at and that blood work revealed a hemoglobin of 8.2 down a gram since her last evaluation and a potassium level of 3.1 no intervention was recommended.  The patient also is complaining of leg cramps associated with this low potassium.  Patient has been followed in my office since her original diagnosis sustained a spontaneous subdural hematoma in November 12, 2021 had a craniotomy at [place] on 2 occasions in December 2021 and fortunately has resolved all of these neurological issues but is very distressed over the level of fatigue that she is experienced.  Patient is followed regularly in my office with echocardiography and is due again within the next several weeks.  Last echo available to me (622/22) revealed an ejection fraction of 55-60 % with evidence of diastolic dysfunction mildly thickened mitral valve with Jazlyn  2 posterior leaflet prolapse 1+ MR 1+ AI and a small pericardial effusion.  Patient presents today fortunately without any other significant cardiovascular symptoms but her level of fatigue and breathlessness is consistent with her degree of anemia.  Procrit was discussed with her oncologist but the oncologist felt that she did not meet the criteria.  She is also not on any potassium supplementations and her renal function is normal.

## 2023-08-16 NOTE — DISCUSSION/SUMMARY
[FreeTextEntry1] : Patient is a very pleasant 68-year-old white female with known stage IV metastatic breast cancer currently responding to her new HER2 antagonist without evidence of progression of disease and some resolution on her last evaluation.  She is due for a PET scan in the next several weeks and right now continues under the care of Dr. Mathews at [facility].  Regarding her current complaints she is  excessively fatigued, complaining of leg cramps and her potassium is 3.1. and she is profoundly anemic with a hemoglobin of 8.2.  Regarding her dyspnea on exertion, fatigue and sleeplessness; it is likely contributed to significantly by her anemia and her 2 antagonist therapy.  Patient's physical exam was notable for lower extremity edema as well as a soft aortic murmur likely related to her anemia.  Her albumin level is low and is likely her lower extremity cramping is related to her low potassium.  At this point in time K-Dur 10 milliequivalents twice daily will be administered.  She advised to supplement magnesium and have boost as a nutritional supplement in an  attempt to boost her protein level.  No diuretics were added to her regimen despite her lower extremity edema.  Her renal function is normal and regarding her hemoglobin of 8.2; it was conveyed to her that she should request from her oncologist Procrit as from a cardiovascular point of view given her structural heart disease hemoglobin of 10 is advisable.  Patient is to return to the office in approximately 3 months time for interval follow-up and to update her echocardiogram which is performed every 3 months on her current therapy as per guidelines.  Joel Goldberg, MD, [facility]

## 2023-08-16 NOTE — REVIEW OF SYSTEMS
[SOB] : shortness of breath [Dyspnea on exertion] : dyspnea during exertion [Lower Ext Edema] : lower extremity edema [Leg Claudication] : intermittent leg claudication [Negative] : Gastrointestinal [Fever] : no fever [Headache] : no headache [Chills] : no chills [Chest Discomfort] : no chest discomfort [Orthopnea] : no orthopnea [PND] : no PND

## 2023-08-16 NOTE — REASON FOR VISIT
[Structural Heart and Valve Disease] : structural heart and valve disease [Hyperlipidemia] : hyperlipidemia [Spouse] : spouse [FreeTextEntry1] : Patient is a very pleasant and unfortunate 68-year-old white female with stage IV breast cancer who presents to the office today to establish her care in [place] with complaints of lower extremity edema and excessive fatigue and breathlessness.

## 2023-08-24 NOTE — ED ADULT NURSE NOTE - NSINTERVENTIONOPT_GEN_ALL_ED
I have reviewed the surgical (or preoperative) H&P that is linked to this encounter, and examined the patient. There are no significant changes    Clinical Conditions Present on Arrival:  Clinically Significant Risk Factors Present on Admission                 # Drug Induced Platelet Defect: home medication list includes an antiplatelet medication        [place]

## 2023-10-25 NOTE — ED ADULT NURSE REASSESSMENT NOTE - NS ED NURSE REASSESS COMMENT FT1
Received patient from [facility] at 2215, IV placed, labs drawn and sent, food and drink provided, awaiting results.

## 2023-10-25 NOTE — ED ADULT TRIAGE NOTE - CHIEF COMPLAINT QUOTE
Pt. sent by [facility] for low platelet count. Pt. reports "my platelets are at 20 and they are nervous my brain is going to bleed again". Pt. has hx. metastatic breast CA currently on chemo. Pt. currently has no complaints

## 2023-10-25 NOTE — ED ADULT NURSE NOTE - OBJECTIVE STATEMENT
Pt is A&O x 3, states she has been being treated for chemo, Pt was called by her MD for a platelet count of 20 and a HGB of 7.

## 2023-10-25 NOTE — PHARMACOTHERAPY INTERVENTION NOTE - COMMENTS
Medication reconciliation completed.  Patient provided list from [facility] of current medication and recent infusions; confirmed with patient & Dr. [facility].

## 2023-10-25 NOTE — ED ADULT NURSE NOTE - NSFALLHARMRISKINTERV_ED_ALL_ED

## 2023-10-26 NOTE — ED PROVIDER NOTE - CLINICAL SUMMARY MEDICAL DECISION MAKING FREE TEXT BOX
pancytopenia likely secondary to chemotherapy.  We will plan for transfusion in ED if ambulating no orthostasis I do feel is reasonable she can follow-up with [facility] tomorrow she had a negative head CT already today she appears in no acute distress and is hemodynamically stable

## 2023-10-26 NOTE — ED PROVIDER NOTE - PATIENT PORTAL LINK FT
You can access the FollowMyHealth Patient Portal offered by [facility] by registering at the following website: [email or web address]. By joining [employer]’s FollowMyHealth portal, you will also be able to view your health information using other applications (apps) compatible with our system. You can access the FollowMyHealth Patient Portal offered by [facility] by registering at the following website: [email or web address]. By joining [employer]’s FollowMyHealth portal, you will also be able to view your health information using other applications (apps) compatible with our system.

## 2023-10-26 NOTE — ED PROVIDER NOTE - PROGRESS NOTE DETAILS
pancytopenia likely secondary to chemotherapy.  We will plan for transfusion in ED if ambulating no orthostasis I do feel is reasonable she can follow-up with [facility] tomorrow she had a negative head CT already today she appears in no acute distress and is hemodynamically stable Patient finished with her 2 units of blood no active bleeding hemodynamically stable she is to follow-up with her [facility] team tomorrow without fail she feels better her CAT scan once again was negative done outpatient today return to ED for intractable headache persistent vomiting new onset motor or sensory deficits patient agrees with plan of care

## 2023-10-26 NOTE — ED ADULT NURSE REASSESSMENT NOTE - NS ED NURSE REASSESS COMMENT FT1
Pt received from GANESH Villafuerte 02:00. No complaints noted at this time, A&O4, blood transfusion running, no reaction noted. Safety and comfort measures maintained.

## 2023-10-26 NOTE — ED PROVIDER NOTE - OBJECTIVE STATEMENT
Patient presents to ED  secondary to [facility] hematologist oncology center telling her to come to ED for blood transfusion.  She was seen at her facility in [place] earlier in the day diagnosed with anemia thrombocytopenia of note she took chemotherapy 2 weeks prior.  She denies any vaginal bleeding.  No melena.  No hematemesis.  Secondary to her dizziness at the outpatient [facility] center she had a negative head CT already today.  Will says she is sent here for blood transfusion.  No fevers.  No chest pain or shortness of breath.  No abdominal pain.  Positive chronic intermittent episodes of dizziness that were noted in [facility]'s note.

## 2023-11-30 NOTE — ED PROVIDER NOTE - MUSCULOSKELETAL, MLM
Pt having trouble with word finding and slurred speech started approx 15 min pta.
Spine appears normal, range of motion is not limited, no muscle or joint tenderness.

## 2024-01-01 ENCOUNTER — APPOINTMENT (OUTPATIENT)
Dept: CARDIOLOGY | Facility: CLINIC | Age: 69
End: 2024-01-01

## 2024-06-27 ENCOUNTER — APPOINTMENT (OUTPATIENT)
Dept: DERMATOLOGY | Facility: CLINIC | Age: 69
End: 2024-06-27

## 2025-01-15 NOTE — ED PROVIDER NOTE - CPE EDP NEURO NORM
Pt called asking if we could check in her appointments to see if she is scheduled in the future to see Dr. La (Neurosurgery) in [place].  Pt aware she does have an appointment scheduled with Dr. La on Monday, 3/10/25 at 1:45 pm.    normal...

## 2025-07-23 NOTE — ED STATDOCS - NEUROLOGICAL, MLM
Nursing Support:  When: Monday through Friday 7:30A-4:30PM except holidays  Where: Our direct line is [phone]  *3  *1.      If you are having a true emergency please call 911.  In the event that the line is busy or it is after hours please leave a voice message and we will return your call.  Please speak clearly, leaving your full name, birth date, best number to reach you and the reason for your call.   Medication refills: We will need the name of the medication, the dosage, the ordering provider, whether you get a 30 or 90 day refill, and the pharmacy name and address.  Medications will be ordered by the provider only.  Nurses cannot call in prescriptions.  Please allow 7 days for medication refills.  Physician requested updates: If your provider requested that you call with an update after starting medication, please be ready to provide us the medication and dosage, what time you take your medication, the time you attempt to fall asleep, time you fall asleep, when you wake up, and what time you get out of bed.  Sleep Study Results: We will contact you with sleep study results and/or next steps after the physician has reviewed your testing.     sensation is normal and strength is normal.